# Patient Record
Sex: MALE | Race: WHITE
[De-identification: names, ages, dates, MRNs, and addresses within clinical notes are randomized per-mention and may not be internally consistent; named-entity substitution may affect disease eponyms.]

---

## 2017-01-25 ENCOUNTER — HOSPITAL ENCOUNTER (EMERGENCY)
Dept: HOSPITAL 74 - JER | Age: 52
Discharge: HOME | End: 2017-01-25
Payer: COMMERCIAL

## 2017-01-25 VITALS — TEMPERATURE: 97.3 F | HEART RATE: 65 BPM | DIASTOLIC BLOOD PRESSURE: 79 MMHG | SYSTOLIC BLOOD PRESSURE: 142 MMHG

## 2017-01-25 VITALS — BODY MASS INDEX: 34.5 KG/M2

## 2017-01-25 DIAGNOSIS — F31.9: ICD-10-CM

## 2017-01-25 DIAGNOSIS — R00.0: ICD-10-CM
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
## 2017-01-25 DIAGNOSIS — S06.0X9A: Primary | ICD-10-CM
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
## 2017-01-25 DIAGNOSIS — M79.1: ICD-10-CM

## 2017-01-25 DIAGNOSIS — I10: ICD-10-CM

## 2017-01-25 PROCEDURE — 3E0234Z INTRODUCTION OF SERUM, TOXOID AND VACCINE INTO MUSCLE, PERCUTANEOUS APPROACH: ICD-10-PCS

## 2017-01-25 NOTE — PDOC
History of Present Illness





- General


History Source: Patient


Exam Limitations: No Limitations





- History of Present Illness


Initial Comments: 


01/25/17 12:42


The patient is a 51-year-old man with a significant past medical history of 

hypertension, rapid heart beats, migraine headaches and bipolar disorder who 

presents to the emergency department for further evaluation of generalized 

bodily pain status post fall yesterday morning at approximately 09:45 AM. As 

per patient, he walked into [employer] yesterday to urinate. Patient got his 

foot caught into a pothole, fell forward on the curb and landing on his hands, 

knees and ultimately injured his head. Patient states he lost consciousness for 

approximately 2 minutes, he woke up lightheaded with blurry vision and 

nauseous. He states he was able to go home and had multiple episodes of emesis 

since he arrived home at approximately 11:00 AM throughout the night and this 

morning. He reports approximately 5-6 episodes of emesis, no blood or bile 

noted. He currently reports experiencing a headache, lightheadedness, nausea, 

blurry vision, mild mid-sternal discomfort and bilateral costochondral pain 

that is exacerbated when taking deep breathes and makes him short of breath. He 

took Percocet and Reglan prior to ER arrival, which helped minimally with his 

pain.He denies other bodily pain or injury





He denies dizziness, lightheadedness, or palpitation.


He denies any fever, chills, cough, diarrhea, dysuria, hematuria, frequency, 

bowel/bladder incontinence or retention, abdominal pain,.





Allergies: Morphine. 


Social History: No tobacco, ETOH and recreational drug use. 








<Yoselin Allison - Last Filed: 01/25/17 15:10>





<Hill Virk - Last Filed: 01/25/17 15:26>





- General


Chief Complaint: Injury


Stated Complaint: FALL/ HEAD, RT KNEE, RIB NECK INJURY


Time Seen by Provider: 01/25/17 12:38





Past History





<Yoselin Allison - Last Filed: 01/25/17 15:10>





- Past Medical History


Cardiac Disorders: Yes (RAPID HEARTBEAT)


HTN: Yes


Seizures: Yes (BIPOLAR)





- Surgical History


Appendectomy: Yes





- Psycho/Social/Smoking Cessation Hx


Anxiety: No


Suicidal Ideation: No


Smoking Status: No


Smoking History: Never smoked


Number of Cigarettes Smoked Daily: 0


Hx Alcohol Use: No


Drug/Substance Use Hx: No


Substance Use Type: None





<Hill Virk - Last Filed: 01/25/17 15:26>





- Past Medical History


Allergies/Adverse Reactions: 


 Allergies











Allergy/AdvReac Type Severity Reaction Status Date / Time


 


morphine Allergy  Rash Verified 01/25/17 12:19


 


meperidine HCl [From Demerol] AdvReac   Verified 01/25/17 12:19











Home Medications: 


Ambulatory Orders





Atenolol [Tenormin] 50 mg PO BID 07/11/13 


Diazepam [Valium] 10 mg PO BID 07/11/13 


Ezetimibe [Zetia] 15 mg PO 07/11/13 


Hydrochlorothiazide [Hctz] 37.5 mg PO DAILY #0 cap 07/11/13 


Hydrocodone Bit/Acetaminophen [Lortab ] 1 tab PO TID PRN 07/11/13 


Naproxen [Naprosyn] 500 mg PO BID PRN #14 tablet 07/11/13 


Rosuvastatin Calcium [Crestor] 10 mg PO 07/11/13 


Sumatriptan [Imitrex] 5 mg NR 07/11/13 











**Review of Systems





- Review of Systems


Constitutional: No: Chills, Fever


HEENTM: Yes: Recent change in vision.  No: Double Vision


Respiratory: No: Cough, Shortness of Breath


Cardiac (ROS): Yes: See HPI


ABD/GI: Yes: Nausea, Vomiting.  No: Diarrhea


: No: Hematuria


Musculoskeletal: Yes: Joint Pain, Muscle Pain


Neurological: Yes: Headache.  No: Ataxia


All Other Systems: Reviewed and Negative





<Hill Virk - Last Filed: 01/25/17 15:26>





*Physical Exam





- Vital Signs


 Last Vital Signs











Temp Pulse Resp BP Pulse Ox


 


 97.6 F   72   20   152/81   95 


 


 01/25/17 12:13  01/25/17 12:13  01/25/17 12:13  01/25/17 12:13  01/25/17 12:13














- Physical Exam


Comments: 


01/25/17 12:42


General: Patient is alert and in no acute distress. Speech is clear and 

appropriate.


Head: C-collar in place. There is a 1.5 cm superficial abrasion to the right 

forehead without underlying scalp deformity. No septal hematoma.


HEENT: Pupils are equal round and reactive to light, extraocular movements are 

intact. The tympanic membranes are clear, no hemotympanum. No facial deformity/

tenderness, no septal hematoma. The oropharynx is clear.


DENTAL: 4th upper incisor decayed tooth with some partial avulsion.


Neck: The trachea is midline, there is no stridor. There is no midline cervical 

spine tenderness, full range of motion of neck.


Chest: There is some sternal discomfort to palpation without crepitus or 

obvious deformity, no ecchymosis or abrasions. There is tenderness in the 

lateral and anterior portion of the right ribs starting around rib 8-10. There 

is a similar discomfort to a lesser degree to the left lateral/anterior ribs. 


Heart: S1-S2, regular rate and rhythm. No murmurs.


Lungs: Clear to auscultation bilaterally. Symmetric chest rise.


Abdomen: Soft/nontender/nondistended. Bowel sounds are normal. No palpable 

liver or spleen enlargement. There is no abdominal or flank ecchymosis.


Back/Pelvis: There is no midline spinal tenderness or step-off. Pelvis is 

stable and nontender.


Extremities: Bilateral incisional knee scars. There is an approximately 

superficial 4-5 cm abrasion over the anterior knee. There is soft tissue 

swelling with some probable small joint effusion laterally and in the 

suprapatellar space. Flexion and extension are intact. There is some ecchymosis 

and soft tissue swelling dorsal aspect of the right 5th metacarpal with some 

tenderness to palpation. There is full range of motion of the MCP, PIP and DIP 

joints. Neurovascularly intact


Neuro: Alert and oriented x3. Cranial nerves II through XII are intact. 5 out 

of 5 motor strength x4 extremities. Finger-nose-finger is intact. No pronator 

drift. 


Skin: See above.


Psych: Affect is appropriate.








<Yoselin Allison - Last Filed: 01/25/17 15:10>





- Vital Signs


 Last Vital Signs











Temp Pulse Resp BP Pulse Ox


 


 97.6 F   72   20   152/81   95 


 


 01/25/17 12:13  01/25/17 12:13  01/25/17 12:13  01/25/17 12:13  01/25/17 12:13














<Hill Virk - Last Filed: 01/25/17 15:26>





ED Treatment Course





- RADIOLOGY


Radiograph Interpretation: 


01/25/17 14:54


EXAM: RAD/RIBS BILATERAL 


IMPRESSION: A frontal view of the chest and 2 oblique views of the rib cage 

were submitted for evaluation Compared to prior chest x-ray dated 7/11/2013 The 

cardiac silhouette remains within normal limits in size and the lung is clear. 

No pneumothorax or pleural effusion is identified. No focal airspace disease is 

seen.. Mediastinum and visualized osseous structures appear intact. No gross 

rib fracture is identified.





EXAM:  RAD/KNEE 2 POS-RIGHT 


IMPRESSION:  There are postoperative changes in the right knee with metallic 

screw in the distal femur] in the proximal tibia. There are mild osteoarthritic 

changes mainly involving the medial knee joint compartment. Mild-to-moderate 

osteoarthritic changes involving the patellofemoral joint. No acute fracture, 

dislocation or joint effusion is present. 





EXAM: RAD/HAND- RIGHT


IMPRESSION: The alignment is satisfactory without evidence of a fracture or 

dislocation. No gross soft tissue swelling or radiopaque foreign body is seen. 





EXAM: CT/HEAD CT WITHOUT CONTRAST 


IMPRESSION: No prior is available for comparison There is mild volume loss, 

ventricular prominence and probable chronic microvascular ischemic changes. No 

mass lesion, focal acute infarct or intracranial hemorrhage is seen. There is 

no shift of the midline structures. Calcification of the cavernous carotid 

arteries are present. The calvarium is intact. Visualized paranasal sinuses and 

mastoid air cells are well aerated. Mild deviation of the nasal septum to the 

left.





EXAM:  CT/CERVICAL SPINE CT W/O CONTR


IMPRESSION: There is straightening of the cervical spine. No compression 

fracture, subluxation or prevertebral soft tissue swelling is seen. No jumped 

facets are identified. A small linear-like bone density seen just posterior to 

superior endplate of C5 vertebral body likely representing calcification of the 

posterior longitudinal ligament and less likely representing an old cortical 

avulsion fracture. At C3-C4 level there is minimal central disc bulge with 

posterior spur formation. Mild degenerative disc disease at C4-C5 level with 

right uncovertebral hypertrophy slightly to moderately narrowing the right 

foramen. C5-C6 mild degenerative disc disease and mild bilateral uncovertebral 

hypertrophy slightly narrowing the foramina. C6-C7 moderate degenerative disc 

disease with anterior and posterior spur formation as well as bilateral 

uncovertebral hypertrophy moderately narrowing the right foramen right 

impinging right C7 nerve root. C7-T1 mild degenerative disc disease and mild 

left paracentral posterior spur formation without cord impingement. Visualized 

portion of the airway appears unremarkable. No gross enlarged lymph nodes are 

identified. Lung windows at the thoracic inlet appear unremarkable. 





<Yoselin Allison - Last Filed: 01/25/17 15:10>





Medical Decision Making





- Medical Decision Making


01/25/17 13:20


A portion of this note was documented by scribe services under my direction. I 

have reviewed the details of the note, within reason, and agree with the 

documentation with the following case summary and management plan written by me.





51-year-old male with history of hypertension, multiple joints/bone pathology 

in the past including cervical spine disease, bilateral knee surgeries 

secondary to trauma in the past, presents with resistant headache with nausea/

vomiting, feeling groggy, right and left rib pain, right hand pain, and right 

knee pain after trip and fall yesterday.





Trauma exam as noted, patient is neurologically intact





51-year-old male with mechanical trip and fall about 24 hours ago, now with 

persistent musculoskeletal complaints and headache with nausea/vomiting. Not on 

blood thinners, is neurologically intact. Rule out TBI, otherwise presentation 

is consistent with concussion. Rule out fractures versus contusions.


CT head, CT C-spine


Bilateral rib films


Right hand x-ray


Right knee x-ray


Pain control, update tetanus


Reassess and dispo





01/25/17 15:17


Trauma imaging is unremarkable. Remains neuro intact c/o mild HA consistent 

with concussion syndrome. Ambulating comfortably, agrees with d/c plan and 

understands return criteria. 








<Hill Virk - Last Filed: 01/25/17 15:26>





*DC/Admit/Observation/Transfer





- Attestations


Scribe Attestion: 


01/25/17 12:42


Documentation prepared by Yoselin Allison, acting as medical scribe for 

Hill Virk MD.





<Yoselin Allison - Last Filed: 01/25/17 15:10>





<Hill Virk - Last Filed: 01/25/17 15:26>


Diagnosis at time of Disposition: 


 Musculoskeletal pain





Accidental fall


Qualifiers:


 Encounter type: initial encounter Qualified Code(s): W19.XXXA - Unspecified 

fall, initial encounter





Closed head injury


Qualifiers:


 Encounter type: initial encounter Qualified Code(s): S09.90XA - Unspecified 

injury of head, initial encounter





Concussion


Qualifiers:


 Encounter type: initial encounter Loss of consciousness presence/duration: 

with LOC of unspecified duration Qualified Code(s): S06.0X9A - Concussion with 

loss of consciousness of unspecified duration, initial encounter





- Discharge Dispostion


Disposition: HOME


Condition at time of disposition: Improved





- Referrals


Referrals: 


Mani Duffy [Primary Care Provider] - 


Kirill Small MD [Staff Physician] - 





- Patient Instructions


Printed Discharge Instructions:  DI for Closed Head Injury, DI for Concussion


Additional Instructions: 


Stay hydrated. Tylenol 1000 mg every 8 hours and/or ibuprofen 600 mg every 8 

hours as needed for pain. Reglan as needed for nausea.





A CT of the head and cervical spine, and xrays of the ribs/R hand/R knee showed 

no acute fractures. You likely did sustain contusions and soft tissue injuries 

to these areas. If symptoms persist, an MRI can be performed as an outpatient 

to further evaluate the cause. 





Your symptoms are most consistent with a concussion. It is recommended that you 

have physical rest, avoiding any activities that may increase the likelihood of 

you reinjuring your head. Cognitive rest is also recommended, avoid prolonged 

monitor exposure, reading, or loud noises.





You should follow up with your primary doctor and/or a neurologist (consider 

calling Dr. Small) as soon as possible regarding today's emergency department 

visit.





Return to the emergency department for any new or concerning symptoms, 

particularly worsening headache, vomiting or confusion, worsening sleepiness, 

focal weakness, severe swelling or discoloration.

## 2017-10-02 ENCOUNTER — HOSPITAL ENCOUNTER (EMERGENCY)
Dept: HOSPITAL 74 - JER | Age: 52
LOS: 1 days | Discharge: HOME | End: 2017-10-03
Payer: COMMERCIAL

## 2017-10-02 VITALS — BODY MASS INDEX: 39 KG/M2

## 2017-10-02 VITALS — HEART RATE: 73 BPM | SYSTOLIC BLOOD PRESSURE: 141 MMHG | DIASTOLIC BLOOD PRESSURE: 93 MMHG | TEMPERATURE: 98 F

## 2017-10-02 DIAGNOSIS — G43.909: ICD-10-CM

## 2017-10-02 DIAGNOSIS — F31.9: ICD-10-CM

## 2017-10-02 DIAGNOSIS — R51: Primary | ICD-10-CM

## 2017-10-02 DIAGNOSIS — I10: ICD-10-CM

## 2017-10-02 DIAGNOSIS — M48.00: ICD-10-CM
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
## 2018-05-09 ENCOUNTER — HOSPITAL ENCOUNTER (INPATIENT)
Dept: HOSPITAL 74 - JER | Age: 53
LOS: 3 days | Discharge: HOME | DRG: 176 | End: 2018-05-12
Attending: INTERNAL MEDICINE | Admitting: INTERNAL MEDICINE
Payer: COMMERCIAL

## 2018-05-09 VITALS — BODY MASS INDEX: 36.9 KG/M2

## 2018-05-09 DIAGNOSIS — G43.909: ICD-10-CM

## 2018-05-09 DIAGNOSIS — I25.10: ICD-10-CM

## 2018-05-09 DIAGNOSIS — R60.9: ICD-10-CM

## 2018-05-09 DIAGNOSIS — R91.1: ICD-10-CM

## 2018-05-09 DIAGNOSIS — E66.9: ICD-10-CM

## 2018-05-09 DIAGNOSIS — K21.9: ICD-10-CM

## 2018-05-09 DIAGNOSIS — I26.99: Primary | ICD-10-CM

## 2018-05-09 DIAGNOSIS — R74.8: ICD-10-CM

## 2018-05-09 DIAGNOSIS — F31.9: ICD-10-CM

## 2018-05-09 DIAGNOSIS — I10: ICD-10-CM

## 2018-05-09 DIAGNOSIS — Z86.718: ICD-10-CM

## 2018-05-09 LAB
ALBUMIN SERPL-MCNC: 3.8 G/DL (ref 3.4–5)
ALP SERPL-CCNC: 123 U/L (ref 45–117)
ALT SERPL-CCNC: 39 U/L (ref 12–78)
ANION GAP SERPL CALC-SCNC: 7 MMOL/L (ref 8–16)
AST SERPL-CCNC: 42 U/L (ref 15–37)
BASOPHILS # BLD: 0.3 % (ref 0–2)
BILIRUB SERPL-MCNC: 0.3 MG/DL (ref 0.2–1)
BNP SERPL-MCNC: 455.64 PG/ML (ref 5–125)
BUN SERPL-MCNC: 22 MG/DL (ref 7–18)
CALCIUM SERPL-MCNC: 8.6 MG/DL (ref 8.5–10.1)
CHLORIDE SERPL-SCNC: 99 MMOL/L (ref 98–107)
CO2 SERPL-SCNC: 35 MMOL/L (ref 21–32)
CREAT SERPL-MCNC: 1.3 MG/DL (ref 0.7–1.3)
DEPRECATED RDW RBC AUTO: 15.6 % (ref 11.9–15.9)
EOSINOPHIL # BLD: 5.1 % (ref 0–4.5)
GLUCOSE SERPL-MCNC: 120 MG/DL (ref 74–106)
HCT VFR BLD CALC: 40.9 % (ref 35.4–49)
HGB BLD-MCNC: 13.8 GM/DL (ref 11.7–16.9)
LYMPHOCYTES # BLD: 36.4 % (ref 8–40)
MAGNESIUM SERPL-MCNC: 2 MG/DL (ref 1.8–2.4)
MCH RBC QN AUTO: 29.9 PG (ref 25.7–33.7)
MCHC RBC AUTO-ENTMCNC: 33.7 G/DL (ref 32–35.9)
MCV RBC: 88.7 FL (ref 80–96)
MONOCYTES # BLD AUTO: 10.5 % (ref 3.8–10.2)
NEUTROPHILS # BLD: 47.7 % (ref 42.8–82.8)
PLATELET # BLD AUTO: 237 K/MM3 (ref 134–434)
PMV BLD: 8.7 FL (ref 7.5–11.1)
POTASSIUM SERPLBLD-SCNC: 4.4 MMOL/L (ref 3.5–5.1)
PROT SERPL-MCNC: 6.9 G/DL (ref 6.4–8.2)
RBC # BLD AUTO: 4.61 M/MM3 (ref 4–5.6)
SODIUM SERPL-SCNC: 141 MMOL/L (ref 136–145)
WBC # BLD AUTO: 7.2 K/MM3 (ref 4–10)

## 2018-05-09 PROCEDURE — G0009 ADMIN PNEUMOCOCCAL VACCINE: HCPCS

## 2018-05-09 NOTE — PDOC
History of Present Illness





- General


Chief Complaint: Pain


Stated Complaint: PAIN


Time Seen by Provider: 05/09/18 19:51


History Source: Patient


Exam Limitations: No Limitations





- History of Present Illness


Initial Comments: 





05/09/18 22:32


Patient is a 53M with history of blood clot in 2008 (not on blood thinners), 

CAD s/p cath showing unknown abnormality in LAD, ?CHF (on lasix), bipolar 

disease and migraines coming in today complaining of chest pain and lower 

extremity swelling/pain for the past week. He describes the chest pain as a 

pressure on the left side of his chest that worsens with exercise and improves 

with rest. Patient states that he had surgery on his left knee in mid-March 

that was unsuccessful. He endorses headache. He denies fevers, chills, nausea, 

vomiting. He states that he has increased his lasix recently to 80mg.





Past History





- Past Medical History


Allergies/Adverse Reactions: 


 Allergies











Allergy/AdvReac Type Severity Reaction Status Date / Time


 


morphine Allergy  Rash Verified 05/09/18 19:56


 


meperidine HCl [From Demerol] AdvReac   Verified 05/09/18 19:56











Home Medications: 


Ambulatory Orders





Atenolol [Tenormin] 50 mg PO DAILY 07/11/13 


Diazepam [Valium] 10 mg PO TID PRN 07/11/13 


Sumatriptan [Imitrex] 100 mg PO DAILY PRN 07/11/13 


Amlodipine Besylate [Norvasc -] 5 mg PO DAILY 10/06/17 


Atorvastatin Ca [Lipitor] 80 mg PO DAILY 10/06/17 


Benazepril HCl [Lotensin (Nf)] 10 mg PO DAILY 10/06/17 


Ergocalciferol [Vitamin D2] 50,000 unit PO Q7D 10/06/17 


Fluticasone Prop 0.05% Nasal [Flonase -] 3 spray NS DAILY 10/06/17 


Ketotifen Fumarate [Allergy Eye Drops] 5 ml OU BID 10/06/17 


Metoclopramide HCl [Reglan] 5 mg PO TID 10/06/17 


Omeprazole 40 mg PO DAILY 10/06/17 


Oxycodone HCl/Acetaminophen [Percocet  mg Tablet] 1 each PO BID PRN 10/06/

17 


Quetiapine Fumarate [Seroquel] 100 mg PO TID 10/06/17 


Zolpidem Tartrate [Ambien] 10 mg PO HS PRN 10/06/17 








Cardiac Disorders: Yes (RAPID HEARTBEAT)


COPD: No


GI Disorders: Yes (GERD)


HTN: Yes


Seizures: Yes (BIPOLAR)


Other medical history: DVT @ 2008





- Surgical History


Abdominal Surgery: Yes (bilat hernia)


Appendectomy: Yes





- Suicide/Smoking/Psychosocial Hx


Smoking Status: No


Smoking History: Never smoked


Have you smoked in the past 12 months: No


Number of Cigarettes Smoked Daily: 0


Hx Alcohol Use: No


Drug/Substance Use Hx: No


Substance Use Type: None





**Review of Systems





- Review of Systems


Able to Perform ROS?: Yes


Comments:: 





05/09/18 23:03


GENERAL/CONSTITUTIONAL: No fever or chills. No weakness.


HEAD, EYES, EARS, NOSE AND THROAT: No change in vision. No sore throat.


CARDIOVASCULAR: No chest pain or shortness of breath


RESPIRATORY: No cough, wheezing, or hemoptysis.


GASTROINTESTINAL: No nausea, vomiting, diarrhea or constipation.


GENITOURINARY: No dysuria, frequency, or change in urination.


MUSCULOSKELETAL: Positive for left knee pain.. No neck or back pain.


SKIN: No rash


NEUROLOGIC: Positive for headache. Negative for vertigo, loss of consciousness, 

or change in strength/sensation.


ENDOCRINE: No increased thirst. No abnormal weight change


HEMATOLOGIC/LYMPHATIC: No anemia. Positive for history of blood clots.


ALLERGIC/IMMUNOLOGIC: No hives or skin allergy.








*Physical Exam





- Vital Signs


 Last Vital Signs











Temp Pulse Resp BP Pulse Ox


 


 98.6 F   81   18   107/60   97 


 


 05/09/18 19:57  05/09/18 19:57  05/09/18 19:57  05/09/18 19:57  05/09/18 19:57














- Physical Exam


Comments: 





05/09/18 23:04


GENERAL: Awake, alert, and fully oriented, in no acute distress


HEAD: No signs of trauma, normocephalic, atraumatic


EYES: PERRLA, EOMI, sclera anicteric, conjunctiva clear


ENT: Auricles normal inspection, hearing grossly normal, nares patent, 

oropharynx clear without


exudates. Moist mucosa


NECK: Normal ROM, supple, no lymphadenopathy, JVD, or masses


LUNGS: No distress, speaks full sentences, clear to auscultation bilaterally


HEART: Regular rate and rhythm, normal S1 and S2, no murmurs, rubs or gallops, 

peripheral pulses normal and equal bilaterally.


ABDOMEN: Soft, nontender, normoactive bowel sounds.  No guarding, no rebound.  

No masses


EXTREMITIES: Normal inspection, Normal range of motion, no pitting edema, left 

leg slightly bigger than right.


NEUROLOGICAL: Cranial nerves II through XII grossly intact.  Normal speech, 

normal gait, no focal sensorimotor deficits


SKIN: Warm, Dry, normal turgor, no rashes or lesions noted.








**Heart Score/ECG Review





- History


History: Moderately suspicious





- Electrocardiogram


EKG: Normal





- Age


Age: 45-65





- Risk Factors


Risk Factors Heart Score: Yes Hx Obesity


Based on the list above the patient has:: 1-2 risk factors





- Troponin


Troponin: </= normal limit





- Score


Heart Score - Total: 3





ED Treatment Course





- LABORATORY


CBC & Chemistry Diagram: 


 05/09/18 20:34





 05/09/18 20:34





- ADDITIONAL ORDERS


Additional order review: 


 Laboratory  Results











  05/09/18 05/09/18





  20:34 20:34


 


PT with INR   Cancelled


 


INR   Cancelled


 


Sodium  141 


 


Potassium  4.4 


 


Chloride  99 


 


Carbon Dioxide  35 H D 


 


Anion Gap  7 L 


 


BUN  22 H D 


 


Creatinine  1.3  D 


 


Creat Clearance w eGFR  57.53 


 


Random Glucose  120 H D 


 


Calcium  8.6 


 


Magnesium  2.0 


 


Total Bilirubin  0.3  D 


 


AST  42 H D 


 


ALT  39 


 


Alkaline Phosphatase  123 H 


 


Troponin I  < 0.02 


 


B-Natriuretic Peptide  455.64 H 


 


Total Protein  6.9 


 


Albumin  3.8 








 











  05/09/18





  20:34


 


RBC  4.61


 


MCV  88.7


 


MCHC  33.7


 


RDW  15.6  D


 


MPV  8.7


 


Neutrophils %  47.7  D


 


Lymphocytes %  36.4  D


 


Monocytes %  10.5 H


 


Eosinophils %  5.1 H D


 


Basophils %  0.3














- RADIOLOGY


Radiology Studies Ordered: 














 Category Date Time Status


 


 CHEST CTA [CT] Stat CT Scan  05/09/18 22:17 Ordered


 


 CXRPORT [CHEST X-RAY PORTABLE*] [RAD] Stat Radiology  05/09/18 22:19 Ordered














- Medications


Given in the ED: 


ED Medications














Discontinued Medications














Generic Name Dose Route Start Last Admin





  Trade Name Freq  PRN Reason Stop Dose Admin


 


Aspirin  162 mg  05/09/18 19:55  05/09/18 20:35





  Asa -  PO  05/09/18 19:56  162 mg





  ONCE ONE   Administration














Medical Decision Making





- Medical Decision Making





05/09/18 23:04


Patient is a 53M with history of bipolar, ?CHF, DVT, CAD here today complaining 

of chest pain. Vital signs stable and normal. Patient does not seem to be a 

reliable historian. States that his blood pressure was 190/110 before coming 

home, is unsure of what medications he takes, and is difficult to redirect to 

questioned ask. Chest pain not very typical. Patient has multiple risk factors 

for DVT and PE with suspicious history. DDx includes, but is not limited to: 

CHF exacerbation, ACS, PE, DVT.





EKG shows normal sinus rhythm with rate of 71. No st elevations/depressions. No 

significant t wave inversions. Normal QRS/MS/QTc intervals.





Labs show:





 Laboratory Tests











  05/09/18





  20:34


 


Troponin I  < 0.02


 


B-Natriuretic Peptide  455.64 H








CBC reassuring. Trop undetectable, BNP mildly elevated. CXR pending.





Will evaluate further with CTA and DVT US. HEART score 3. Will do second trop 

and discharge if negative.





05/10/18 03:14


DVT negative. CTA positive for LLL clot. INR cancelled earlier, redrawn before 

starting anticoagulation. Patient started on 120mg enoxaparin. Will admit to 

tele via hospitalist.


05/10/18 03:19


Dr Vieyra accepted admission.





*DC/Admit/Observation/Transfer


Diagnosis at time of Disposition: 


 Pulmonary embolism








- Discharge Dispostion


Condition at time of disposition: Stable


Decision to Admit order: Yes





- Referrals





- Patient Instructions





- Post Discharge Activity

## 2018-05-09 NOTE — PDOC
Rapid Medical Evaluation


Time Seen by Provider: 05/09/18 19:51


Medical Evaluation: 


 Allergies











Allergy/AdvReac Type Severity Reaction Status Date / Time


 


morphine Allergy  Rash Verified 10/02/17 22:25


 


meperidine HCl [From Demerol] AdvReac   Verified 10/02/17 22:25











05/09/18 19:51


I have performed a brief in-person evaluation of this patient.





The patient presents with a chief complaint of: SOB





Pertinent physical exam findings: crackles to b/l bases, 3+ pedal edema





I have ordered the following: CBC, CMP, BNP, UA, CXR, Cardiac profile, EKG, 

coags





The patient will proceed to the ED for further evaluation.








**Discharge Disposition





- Diagnosis


 SOB (shortness of breath)








- Referrals





- Patient Instructions





- Post Discharge Activity

## 2018-05-09 NOTE — PDOC
Attending Attestation





- HPI


HPI: 





05/09/18 23:01


The patient is a 53 year old male with a significant PMH of DVT (2008), CAD (s/

p catheterization), recent left knee surgery, HTN, and GERD who presents to the 

emergency department with left leg pain, bilateral leg swelling, and chest 

pain. He describes his chest pain as a pressure-like sensation localized on the 

left side, which is aggravated by exercise and alleviated by rest. THe patient 

denies fever or chills. He denies weakness or numbness.


 


Allergies: Morphine, Meperidine HCl. 








<Calderon Ramsey - Last Filed: 05/09/18 23:01>





- Resident


Resident Name: Stefan Mercedes





- ED Attending Attestation


I have performed the following: I have examined & evaluated the patient, The 

case was reviewed & discussed with the resident, I agree w/resident's findings 

& plan, Exceptions are as noted





- Physicial Exam


PE: 





05/10/18 19:27


 *Physical Exam





General Appearance: Yes: Appropriately Dressed.  No: Apparent Distress, 

Intoxicated


HEENT: positive: EOMI, ANDREE, Normal ENT Inspection, Normal Voice, TMs Normal, 

Pharynx Normal.  negative: Pale Conjunctivae, Photophobia, Scleral Icterus (R), 

Scleral Icterus (L)


Neck: positive: Trachea midline, Normal Thyroid, Supple.  negative: Tender, 

Rigid, Carotid bruit, Stridor, Lymphadenopathy (R), Lymphadenopathy (L), 

Thyromegaly


Respiratory/Chest: positive: Lungs Clear, Normal Breath Sounds.  negative: 

Chest Tender, Respiratory Distress, Accessory Muscle Use, Labored Respiration, 

RES, Crackles, Rales, Rhonchi, Stridor, Wheezing, Dullness


Cardiovascular: positive: Regular Rhythm, Regular Rate, S1, S2.  negative: Edema

, JVD, Murmur, Bradycardia, Tachycardia


Vascular Pulses: Dorsalis-Pedis (R): 2+, Doralis-Pedis (L): 2+


Gastrointestinal/Abdominal: positive: Normal Bowel Sounds, Flat, Soft.  negative

: Tender, Organomegaly, Pulsatile Mass, Increased Bowel Sounds, Decreased BS, 

Distended, Guarding, Rebound, Hernia, Hepatomegaly, Spleenomegaly


Lymphatic: negative: Adenopathy, Tenderness


Musculoskeletal: positive: Normal Inspection.  negative: CVA Tenderness, 

Decreased Range of Motion


Extremity: positive: Normal Capillary Refill, Normal Inspection, Normal Range 

of Motion, Pelvis Stable.  negative: Tender, Pedal Edema, Swelling, Erythema


Integumentary: positive: Normal Color, Dry, Warm.  negative: Cyanotic, Erythema

, Jaundice, Rash


Neurologic: positive: CNs II-XII NML intact, Fully Oriented, Alert, Normal Mood/

Affect, Motor Strength 5/5.  negative: EOM Palsy, Facial Droop, Sensory Deficit








- Medical Decision Making





05/10/18 19:31


Pt  admitted for further treatment





<Maik Balderrama - Last Filed: 05/10/18 19:32>





**Heart Score/ECG Review


  ** #1





05/09/18 23:01


EKG done at 20:33


Vent rate 71 bpm 


Normal sinus rhythm 


Normal ECG 





<Calderon Ramsey - Last Filed: 05/09/18 23:01>

## 2018-05-10 LAB
INR BLD: 1.11 (ref 0.82–1.09)
PT PNL PPP: 12.5 SEC (ref 9.7–13)

## 2018-05-10 RX ADMIN — GABAPENTIN SCH MG: 300 CAPSULE ORAL at 21:56

## 2018-05-10 RX ADMIN — ACETAMINOPHEN PRN MG: 325 TABLET ORAL at 21:57

## 2018-05-10 RX ADMIN — ZOLPIDEM TARTRATE PRN MG: 5 TABLET, COATED ORAL at 21:56

## 2018-05-10 RX ADMIN — QUETIAPINE FUMARATE SCH MG: 100 TABLET ORAL at 21:56

## 2018-05-10 RX ADMIN — AMLODIPINE BESYLATE SCH MG: 5 TABLET ORAL at 10:08

## 2018-05-10 RX ADMIN — ENOXAPARIN SODIUM SCH MG: 120 INJECTION SUBCUTANEOUS at 21:55

## 2018-05-10 RX ADMIN — QUETIAPINE FUMARATE SCH MG: 100 TABLET ORAL at 14:33

## 2018-05-10 RX ADMIN — ATENOLOL SCH MG: 50 TABLET ORAL at 10:09

## 2018-05-10 RX ADMIN — FUROSEMIDE SCH MG: 40 TABLET ORAL at 10:08

## 2018-05-10 RX ADMIN — QUETIAPINE FUMARATE SCH MG: 100 TABLET ORAL at 06:32

## 2018-05-10 NOTE — PN
Teaching Attending Note


Name of Resident: Doreen Crisostomo





ATTENDING PHYSICIAN STATEMENT





I saw and evaluated the patient.


Chart, data, imaging reviewed. 


I reviewed the resident's note and discussed the case with the resident.


I agree with the resident's findings and plan as documented.








SUBJECTIVE:


53 year old male with a significant PMH of DVT (2008), CAD (s/p catheterization)

, recent left knee surgery, HTN, and GERD who presents to the emergency 

department with left knee pain, bilateral leg swelling, and sharp left sided 

lower chest pain since 5/9. Worsened with breathing. 


No recent trauma.  CTA of chest showed small left lung PE. Saturating well on 

room air. No tachycardia. 











OBJECTIVE:


 Last Vital Signs











Temp Pulse Resp BP Pulse Ox


 


 98.2 F   68   20   110/68   99 


 


 05/10/18 01:27  05/10/18 01:27  05/10/18 01:27  05/10/18 01:27  05/10/18 01:27








general -nad, comfortable, obese 


heent- at, nc, no sinus tenderness


cv-s1+s2+rrr 


chest  clear to ausculation


abdomen- obese, nontender 


ext- trace pedal edema, no cynaosis 


skin- no rashes 





 Abnormal Lab Results











  05/09/18 05/09/18





  20:34 20:34


 


Monocytes %  10.5 H 


 


Eosinophils %  5.1 H D 


 


Carbon Dioxide   35 H D


 


Anion Gap   7 L


 


BUN   22 H D


 


Random Glucose   120 H D


 


AST   42 H D


 


Alkaline Phosphatase   123 H


 


B-Natriuretic Peptide   455.64 H














ASSESSMENT AND PLAN:





#Pulm embolism, unprovoked - LLL perfusion defect, likely cause of the chest 

pain, hemodynamicaly stable. (ACS unlikely as trop x2 negative and no acute 

ischemic changes on EKG)


-lovenox 120mg sc q12hrs 


-consider to switch to oral anticoagulant for discharge 


-heme/onc eval for unprovoked pe for duration of anticoagulation 





#LE swelling, might be due to CHF or venous stasis, dvt was r/o


-transthoracic echo 





#HTN


- continue atenolol 50mg po daily


- lasix 40mg po daily, norvasc 5, atenolol 50mg po dialy





#DVT- on lovenox 


-see resident note for details

## 2018-05-10 NOTE — PN
Physical Exam: 


SUBJECTIVE: Patient seen and examined. The patient is complaining of pain on 

left side of his chest and fatigue. He denies SOB, palpitations, 








OBJECTIVE:





 Vital Signs











 Period  Temp  Pulse  Resp  BP Sys/Guerra  Pulse Ox


 


 Last 24 Hr  98.2 F-98.6 F  68-81  18-20  107-122/60-75  95-99











GENERAL: The patient is awake, alert, and fully oriented, in no acute distress.


HEAD: Normal with no signs of trauma.


EYES: PERRL, extraocular movements intact, sclera anicteric, conjunctiva clear. 

No ptosis. 


ENT: Ears normal, nares patent, oropharynx clear without exudates, moist mucous 


membranes.


NECK: Trachea midline, full range of motion, supple. 


LUNGS: Breath sounds equal, clear to auscultation bilaterally, no wheezes, no 

crackles, no 


accessory muscle use. 


HEART: Regular rate and rhythm, S1, S2 without murmur, rub or gallop.


ABDOMEN: Soft, nontender, nondistended, normoactive bowel sounds, no guarding, 

no 


rebound, no hepatosplenomegaly, no masses.


EXTREMITIES: 2+ pulses, warm, well-perfused, no edema. 


NEUROLOGICAL: Cranial nerves II through XII grossly intact. Normal speech, gait 

not 


observed.


PSYCH: Normal mood, normal affect.


SKIN: Warm, dry, normal turgor, no rashes or lesions noted














 Laboratory Results - last 24 hr











  05/09/18 05/09/18 05/09/18





  20:34 20:34 20:34


 


WBC  7.2  


 


RBC  4.61  


 


Hgb  13.8  


 


Hct  40.9  


 


MCV  88.7  


 


MCH  29.9  


 


MCHC  33.7  


 


RDW  15.6  D  


 


Plt Count  237  


 


MPV  8.7  


 


Neutrophils %  47.7  D  


 


Lymphocytes %  36.4  D  


 


Monocytes %  10.5 H  


 


Eosinophils %  5.1 H D  


 


Basophils %  0.3  


 


PT with INR   Cancelled 


 


INR   Cancelled 


 


Sodium    141


 


Potassium    4.4


 


Chloride    99


 


Carbon Dioxide    35 H D


 


Anion Gap    7 L


 


BUN    22 H D


 


Creatinine    1.3  D


 


Creat Clearance w eGFR    57.53


 


Random Glucose    120 H D


 


Calcium    8.6


 


Magnesium    2.0


 


Total Bilirubin    0.3  D


 


AST    42 H D


 


ALT    39


 


Alkaline Phosphatase    123 H


 


Creatine Kinase    274


 


Creatine Kinase Index    0.6


 


CK-MB (CK-2)    1.77


 


Troponin I    < 0.02


 


B-Natriuretic Peptide    455.64 H


 


Total Protein    6.9


 


Albumin    3.8














  05/10/18 05/10/18





  00:37 03:35


 


WBC  


 


RBC  


 


Hgb  


 


Hct  


 


MCV  


 


MCH  


 


MCHC  


 


RDW  


 


Plt Count  


 


MPV  


 


Neutrophils %  


 


Lymphocytes %  


 


Monocytes %  


 


Eosinophils %  


 


Basophils %  


 


PT with INR   12.50


 


INR   1.11


 


Sodium  


 


Potassium  


 


Chloride  


 


Carbon Dioxide  


 


Anion Gap  


 


BUN  


 


Creatinine  


 


Creat Clearance w eGFR  


 


Random Glucose  


 


Calcium  


 


Magnesium  


 


Total Bilirubin  


 


AST  


 


ALT  


 


Alkaline Phosphatase  


 


Creatine Kinase  284 


 


Creatine Kinase Index  0.6 


 


CK-MB (CK-2)  1.857 


 


Troponin I  < 0.02 


 


B-Natriuretic Peptide  


 


Total Protein  


 


Albumin  








Active Medications











Generic Name Dose Route Start Last Admin





  Trade Name Freq  PRN Reason Stop Dose Admin


 


Amlodipine Besylate  5 mg  05/10/18 10:00  05/10/18 10:08





  Norvasc -  PO   5 mg





  DAILY TINA   Administration


 


Atenolol  50 mg  05/10/18 10:00  05/10/18 10:09





  Tenormin -  PO   50 mg





  DAILY TINA   Administration


 


Enoxaparin Sodium  120 mg  05/10/18 22:00  





  Lovenox -  SQ   





  BID TINA   


 


Furosemide  40 mg  05/10/18 10:00  05/10/18 10:08





  Lasix -  PO   40 mg





  DAILY TINA   Administration


 


Quetiapine Fumarate  100 mg  05/10/18 06:00  05/10/18 06:32





  Seroquel -  PO   100 mg





  TID TINA   Administration











ASSESSMENT/PLAN:


53 year r old man with HTN, hx of DVT, presents with left lower chest pain and 

SOB found to have left lung pe.  





Pulmonary embolism: 


-LLL, hemodynamically stable, no tachycardia, BP controlled, likely cause of 

the chest pain


-continue Lovenox 120 mg IV BID


-hematology consulted for duration of AC 


-Pulmonary consulted





LE swelling, 


-might be due to CHF


-echo ordered for evaluation, r/o right heart strain from PE


-continue lasix 40mg po daily


-Dupplex of lower extremities no acute pathology





HTN:


- continue atenolol 50mg po daily


- cont lasix 40mg po daily,


- cont norvasc 5 daily





Chronic migranes:


-continue Sumatriptan





Hyperlipidemia:


-continue Atorvastatin





GERD:


-cont Omeprazole





Bipolar disorder:


-cont Seroquel





DVT: on lovenox





F/E/N:no/no changes/low sodium





Disposition: telemetry monitoring








Problem List





- Problems


(1) Pulmonary embolism


Code(s): I26.99 - OTHER PULMONARY EMBOLISM WITHOUT ACUTE COR PULMONALE   





(2) Hyperlipidemia


Code(s): E78.5 - HYPERLIPIDEMIA, UNSPECIFIED   





(3) Hypertension


Code(s): I10 - ESSENTIAL (PRIMARY) HYPERTENSION   





Visit type





- Emergency Visit


Emergency Visit: Yes


ED Registration Date: 05/10/18


Care time: The patient presented to the Emergency Department on the above date 

and was hospitalized for further evaluation of their emergent condition.





- New Patient


This patient is new to me today: Yes


Date on this admission: 05/10/18





- Critical Care


Critical Care patient: No

## 2018-05-10 NOTE — EKG
Test Reason : 

Blood Pressure : ***/*** mmHG

Vent. Rate : 071 BPM     Atrial Rate : 071 BPM

   P-R Int : 172 ms          QRS Dur : 086 ms

    QT Int : 380 ms       P-R-T Axes : 016 -16 029 degrees

   QTc Int : 412 ms

 

NORMAL SINUS RHYTHM

NORMAL ECG

WHEN COMPARED WITH ECG OF 11-JUL-2013 15:58,

NO SIGNIFICANT CHANGE WAS FOUND

Confirmed by DAY BOLTON MD ([id number]) on 5/10/2018 11:44:20 AM

 

Referred By:             Confirmed By:DAY BOLTON MD

## 2018-05-10 NOTE — PN
Teaching Attending Note


Name of Resident: Arely Mullins





ATTENDING PHYSICIAN STATEMENT





I saw and evaluated the patient.


I reviewed the resident's note and discussed the case with the resident.


I agree with the resident's findings and plan as documented.








SUBJECTIVE: Patient is concerned about swelling of his feet. He denies SOB but 

still reports discomfort in his lower left anterior chest.








OBJECTIVE:


 Vital Signs











 Period  Temp  Pulse  Resp  BP Sys/Guerra  Pulse Ox


 


 Last 24 Hr  98.2 F-98.6 F  68-81  18-20  107-122/60-75  95-99








HEART: S1S2, RRR


LUNGS: Clear


ABDOMEN: Obese, soft, non-tender, non-distended, normal BS


EXTREMITIES: Trace bilateral pedal edema








 Laboratory Results - last 24 hr











  05/09/18 05/09/18 05/09/18





  20:34 20:34 20:34


 


WBC  7.2  


 


RBC  4.61  


 


Hgb  13.8  


 


Hct  40.9  


 


MCV  88.7  


 


MCH  29.9  


 


MCHC  33.7  


 


RDW  15.6  D  


 


Plt Count  237  


 


MPV  8.7  


 


Neutrophils %  47.7  D  


 


Lymphocytes %  36.4  D  


 


Monocytes %  10.5 H  


 


Eosinophils %  5.1 H D  


 


Basophils %  0.3  


 


PT with INR   Cancelled 


 


INR   Cancelled 


 


Sodium    141


 


Potassium    4.4


 


Chloride    99


 


Carbon Dioxide    35 H D


 


Anion Gap    7 L


 


BUN    22 H D


 


Creatinine    1.3  D


 


Creat Clearance w eGFR    57.53


 


Random Glucose    120 H D


 


Calcium    8.6


 


Magnesium    2.0


 


Total Bilirubin    0.3  D


 


AST    42 H D


 


ALT    39


 


Alkaline Phosphatase    123 H


 


Creatine Kinase    274


 


Creatine Kinase Index    0.6


 


CK-MB (CK-2)    1.77


 


Troponin I    < 0.02


 


B-Natriuretic Peptide    455.64 H


 


Total Protein    6.9


 


Albumin    3.8














  05/10/18 05/10/18





  00:37 03:35


 


WBC  


 


RBC  


 


Hgb  


 


Hct  


 


MCV  


 


MCH  


 


MCHC  


 


RDW  


 


Plt Count  


 


MPV  


 


Neutrophils %  


 


Lymphocytes %  


 


Monocytes %  


 


Eosinophils %  


 


Basophils %  


 


PT with INR   12.50


 


INR   1.11


 


Sodium  


 


Potassium  


 


Chloride  


 


Carbon Dioxide  


 


Anion Gap  


 


BUN  


 


Creatinine  


 


Creat Clearance w eGFR  


 


Random Glucose  


 


Calcium  


 


Magnesium  


 


Total Bilirubin  


 


AST  


 


ALT  


 


Alkaline Phosphatase  


 


Creatine Kinase  284 


 


Creatine Kinase Index  0.6 


 


CK-MB (CK-2)  1.857 


 


Troponin I  < 0.02 


 


B-Natriuretic Peptide  


 


Total Protein  


 


Albumin  








 Current Medications











Generic Name Dose Route Start Last Admin





  Trade Name Freq  PRN Reason Stop Dose Admin


 


Amlodipine Besylate  5 mg  05/10/18 10:00  





  Norvasc -  PO   





  DAILY [facility]   


 


Atenolol  50 mg  05/10/18 10:00  





  Tenormin -  PO   





  DAILY [facility]   


 


Furosemide  40 mg  05/10/18 10:00  





  Lasix -  PO   





  DAILY [facility]   


 


Quetiapine Fumarate  100 mg  05/10/18 06:00  05/10/18 06:32





  Seroquel -  PO   100 mg





  TID TINA   Administration














ASSESSMENT AND PLAN:


This is a 53 year old man with a history of HTN, CAD, DVT, bipolar disorder, 

migraines, chronic pain who presented to the ED with chest pain and SOB.  





1. Acute pulmonary embolus


   - Continue Lovenox


   - Hematology, pulmonary consults


2. Bilateral leg edema


   - Clinically, no evidence of CHF


   - Venous dopplers negative for DVT


   - Echo ordered


   - Continue Lasix


3. CAD


4. HTN


   - Continue atenolol, Lasix, Norvasc


5. Bipolar disorder


   - Continue Seroquel


6. Migraine headaches


7. Chronic pain
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
## 2018-05-10 NOTE — CONSULT
Consult


Consult Specialty:: Hematology 





- History of Present Illness


History of Present Illness: 





53 yr old man with hx of CAD(s/p cath), HTN, bipolar d.o with depression, 

migraines, chronic pain, s/p multiple joint surgeries presented to ED c/o left 

lower chest, nonradiating, intermittent, nonpositional, non-reproducible, a/w 

exertional dyspnea for one day. a/w with left hand tingling and swelling. 





No family hx of blood clots


No family hx of cancer





Hematology consulted for diagnosis of PE








- History Source


History Provided By: Patient, Medical Record





- Alcohol/Substance Use


Hx Alcohol Use: No





- Smoking History


Smoking history: Never smoked


Have you smoked in the past 12 months: No


Aproximately how many cigarettes per day: 0





Home Medications





- Allergies


Allergies/Adverse Reactions: 


 Allergies











Allergy/AdvReac Type Severity Reaction Status Date / Time


 


morphine Allergy  Rash Verified 05/09/18 19:56


 


meperidine HCl [From Demerol] AdvReac   Verified 05/09/18 19:56














- Home Medications


Home Medications: 


Ambulatory Orders





Atenolol [Tenormin] 50 mg PO DAILY 07/11/13 


Diazepam [Valium] 10 mg PO TID PRN 07/11/13 


Sumatriptan [Imitrex] 100 mg PO DAILY PRN 07/11/13 


Amlodipine Besylate [Norvasc -] 5 mg PO DAILY 10/06/17 


Atorvastatin Ca [Lipitor] 80 mg PO DAILY 10/06/17 


Benazepril HCl [Lotensin (Nf)] 10 mg PO DAILY 10/06/17 


Ergocalciferol [Vitamin D2] 50,000 unit PO Q7D 10/06/17 


Fluticasone Prop 0.05% Nasal [Flonase -] 3 spray NS DAILY 10/06/17 


Ketotifen Fumarate [Allergy Eye Drops] 5 ml OU BID 10/06/17 


Metoclopramide HCl [Reglan] 5 mg PO TID 10/06/17 


Omeprazole 40 mg PO DAILY 10/06/17 


Oxycodone HCl/Acetaminophen [Percocet  mg Tablet] 1 each PO BID PRN 10/06/

17 


Quetiapine Fumarate [Seroquel] 100 mg PO TID 10/06/17 


Zolpidem Tartrate [Ambien] 10 mg PO HS PRN 10/06/17 











Physical Exam


Vital Signs: 


 Vital Signs











Temperature  98.2 F   05/10/18 01:27


 


Pulse Rate  74   05/10/18 06:55


 


Respiratory Rate  18   05/10/18 10:00


 


Blood Pressure  122/75   05/10/18 06:55


 


O2 Sat by Pulse Oximetry (%)  95   05/10/18 06:55











Constitutional: Yes: No Distress, Calm


Eyes: Yes: Conjunctiva Clear


HENT: Yes: Atraumatic, Normocephalic


Neck: Yes: Supple, Trachea Midline


Cardiovascular: Yes: Regular Rate and Rhythm


Respiratory: Yes: Regular, CTA Bilaterally


Gastrointestinal: Yes: Normal Bowel Sounds, Abdomen, Obese, Other (mass-like 

soft tissue present below the left nipple).  No: Ascites


Musculoskeletal: Yes: WNL


Edema: No


Wound/Incision: Yes: Clean/Dry


Labs: 


 CBC, BMP





 05/09/18 20:34 





 05/09/18 20:34 











Imaging





- Results


Cat Scan: Report Reviewed, Image Reviewed





Problem List





- Problems


(1) Pulmonary embolism


Code(s): I26.99 - OTHER PULMONARY EMBOLISM WITHOUT ACUTE COR PULMONALE   





(2) Musculoskeletal pain


Code(s): M79.1 - MYALGIA   





(3) Accidental fall


Code(s): W19.XXXA - UNSPECIFIED FALL, INITIAL ENCOUNTER   


Qualifiers: 


   Encounter type: initial encounter   Qualified Code(s): W19.XXXA - 

Unspecified fall, initial encounter   





(4) Chest pain


Code(s): R07.9 - CHEST PAIN, UNSPECIFIED   





Assessment/Plan





New PE: 


Etiology: Likely multi-factorial- obesity, recent surgery, immobilization, but 

strongly advised the pt that he needs age appropriate cancer screening, he 

never had colonoscopy. May have had a DVT progressed. 


Alk phos slightly elevated--monitor , for US liver/spleen


On physical exam: has a palpable soft tissue mass-like below his left nipple 

area, will order soft tissue US , to characterize further. 


treatment: presently lovenox, can start NOACs ( either xarelto 15 mg twice 

daily with food for 21 days followed by 20 mg once daily with food. or eliquis 

10 mg twice daily for 7 days followed by 5 mg twice daily ). dw with him about 

NOACs. Please ensure insurance coverage. 


duration:for at least a period of 3-6m for now. 


for OP f/u

## 2018-05-11 LAB
ALBUMIN SERPL-MCNC: 3.6 G/DL (ref 3.4–5)
ALP SERPL-CCNC: 99 U/L (ref 45–117)
ALT SERPL-CCNC: 33 U/L (ref 12–78)
ANION GAP SERPL CALC-SCNC: 6 MMOL/L (ref 8–16)
AST SERPL-CCNC: 28 U/L (ref 15–37)
BILIRUB SERPL-MCNC: 0.4 MG/DL (ref 0.2–1)
BUN SERPL-MCNC: 13 MG/DL (ref 7–18)
CALCIUM SERPL-MCNC: 8.8 MG/DL (ref 8.5–10.1)
CHLORIDE SERPL-SCNC: 103 MMOL/L (ref 98–107)
CO2 SERPL-SCNC: 31 MMOL/L (ref 21–32)
CREAT SERPL-MCNC: 0.9 MG/DL (ref 0.7–1.3)
GLUCOSE SERPL-MCNC: 100 MG/DL (ref 74–106)
POTASSIUM SERPLBLD-SCNC: 4.2 MMOL/L (ref 3.5–5.1)
PROT SERPL-MCNC: 6.5 G/DL (ref 6.4–8.2)
SODIUM SERPL-SCNC: 140 MMOL/L (ref 136–145)

## 2018-05-11 RX ADMIN — ATORVASTATIN CALCIUM SCH: 80 TABLET, FILM COATED ORAL at 11:40

## 2018-05-11 RX ADMIN — GABAPENTIN SCH MG: 300 CAPSULE ORAL at 06:08

## 2018-05-11 RX ADMIN — GABAPENTIN SCH MG: 300 CAPSULE ORAL at 14:23

## 2018-05-11 RX ADMIN — FLUTICASONE PROPIONATE SCH SPRAYS: 50 SPRAY, METERED NASAL at 09:53

## 2018-05-11 RX ADMIN — ACETAMINOPHEN PRN MG: 325 TABLET ORAL at 09:55

## 2018-05-11 RX ADMIN — GABAPENTIN SCH MG: 300 CAPSULE ORAL at 21:55

## 2018-05-11 RX ADMIN — PANTOPRAZOLE SODIUM SCH: 40 TABLET, DELAYED RELEASE ORAL at 11:40

## 2018-05-11 RX ADMIN — QUETIAPINE FUMARATE SCH MG: 100 TABLET ORAL at 21:55

## 2018-05-11 RX ADMIN — ENOXAPARIN SODIUM SCH MG: 120 INJECTION SUBCUTANEOUS at 09:55

## 2018-05-11 RX ADMIN — ATENOLOL SCH: 50 TABLET ORAL at 11:40

## 2018-05-11 RX ADMIN — AMOXICILLIN AND CLAVULANATE POTASSIUM SCH TAB: 875; 125 TABLET, FILM COATED ORAL at 16:57

## 2018-05-11 RX ADMIN — AMLODIPINE BESYLATE SCH: 5 TABLET ORAL at 11:40

## 2018-05-11 RX ADMIN — FUROSEMIDE SCH: 40 TABLET ORAL at 11:40

## 2018-05-11 RX ADMIN — FLUTICASONE PROPIONATE SCH SPRAYS: 50 SPRAY, METERED NASAL at 23:00

## 2018-05-11 RX ADMIN — QUETIAPINE FUMARATE SCH MG: 100 TABLET ORAL at 06:08

## 2018-05-11 RX ADMIN — QUETIAPINE FUMARATE SCH MG: 100 TABLET ORAL at 13:02

## 2018-05-11 RX ADMIN — ACETAMINOPHEN PRN MG: 325 TABLET ORAL at 17:27

## 2018-05-11 RX ADMIN — ENOXAPARIN SODIUM SCH MG: 120 INJECTION SUBCUTANEOUS at 21:55

## 2018-05-11 RX ADMIN — ZOLPIDEM TARTRATE PRN MG: 5 TABLET, COATED ORAL at 23:00

## 2018-05-11 NOTE — PN
Progress Note (short form)





- Note


Progress Note: 





PAtient seen and examined


Still with some left chest pain





 Last Vital Signs











Temp Pulse Resp BP Pulse Ox


 


 97.4 F L  77   18   123/50   98 


 


 05/12/18 06:00  05/12/18 06:00  05/12/18 06:00  05/12/18 06:00  05/11/18 21:00








Cor: RSR, No murmurs, No gallops


Lungs: Clear to P&A


Abd: Soft, Normal bowel sounds, No organomegaly


Ext:No significant edema








Labs/Meds reviewed





A/P





52 y/o patient with new PE: 


Etiology: Likely multi-factorial- obesity, recent surgery, immobilization, but 

strongly advised the pt that he needs age appropriate cancer screening, he 

never had colonoscopy. 


Alk phos slightly elevated--monitor , 


u/s liver/spleen --fatty liver. chest u/s --no discrete mass


treatment: presently lovenox, can start NOACs ( either xarelto 15 mg twice 

daily with food for 21 days followed by 20 mg once daily with food. or eliquis 

10 mg twice daily for 7 days followed by 5 mg twice daily ). dw with him about 

NOACs. Please ensure insurance coverage.

## 2018-05-11 NOTE — PN
Teaching Attending Note


Name of Resident: Arely Mullins





ATTENDING PHYSICIAN STATEMENT





I saw and evaluated the patient.


I reviewed the resident's note and discussed the case with the resident.


I agree with the resident's findings and plan as documented.








SUBJECTIVE: Patient continues to have left-sided chest pain.








OBJECTIVE:


 Vital Signs











 Period  Temp  Pulse  Resp  BP Sys/Guerra  Pulse Ox


 


 Last 24 Hr  97.6 F-98.0 F  64-82  20-22  /56-74  96-98





HEART: S1S2, RRR


LUNGS: Clear


ABDOMEN: Obese, soft, non-tender, non-distended, normal BS


EXTREMITIES: Trace bilateral pedal edema








 Laboratory Results - last 24 hr











  05/11/18 05/11/18





  07:40 12:53


 


Sodium  140 


 


Potassium  4.2 


 


Chloride  103 


 


Carbon Dioxide  31 


 


Anion Gap  6 L 


 


BUN  13  D 


 


Creatinine  0.9  D 


 


Creat Clearance w eGFR  > 60 


 


Random Glucose  100 


 


Calcium  8.8 


 


Total Bilirubin  0.4  D 


 


AST  28  D 


 


ALT  33 


 


Alkaline Phosphatase  99 


 


Troponin I   < 0.02


 


Total Protein  6.5 


 


Albumin  3.6 








 Current Medications











Generic Name Dose Route Start Last Admin





  Trade Name Freq  PRN Reason Stop Dose Admin


 


Acetaminophen  325 mg  05/10/18 20:16  05/11/18 09:55





  Tylenol -  PO   325 mg





  Q8H PRN   Administration





  PAIN 7-10   


 


Amlodipine Besylate  5 mg  05/10/18 10:00  05/11/18 11:40





  Norvasc -  PO   Not Given





  DAILY [facility]   


 


Amoxicillin/Clavulanate Potassium  1 tab  05/11/18 17:30  





  Augmentin - 875mg Tablet  PO   





  BID@0800,1730 [facility]   


 


Atenolol  50 mg  05/10/18 10:00  05/11/18 11:40





  Tenormin -  PO   Not Given





  DAILY TINA   


 


Atorvastatin Calcium  80 mg  05/11/18 10:00  05/11/18 11:40





  Lipitor -  PO   Not Given





  DAILY TINA   


 


Diazepam  10 mg  05/10/18 20:48  05/11/18 13:03





  Valium -  PO   10 mg





  TID PRN   Administration





  MUSCLE SPASMS   


 


Enoxaparin Sodium  120 mg  05/10/18 22:00  05/11/18 09:55





  Lovenox -  SQ   120 mg





  BID TINA   Administration


 


Fluticasone Propionate  2 spray  05/11/18 10:00  05/11/18 09:53





  Flonase -  NS   2 sprays





  DAILY TINA   Administration


 


Furosemide  40 mg  05/10/18 10:00  05/11/18 11:40





  Lasix -  PO   Not Given





  DAILY TINA   


 


Gabapentin  600 mg  05/10/18 22:00  05/11/18 14:23





  Neurontin -  PO   600 mg





  TID TINA   Administration


 


Oxycodone HCl  10 mg  05/10/18 20:16  05/11/18 09:54





  Roxicodone -  PO   10 mg





  Q8H PRN   Administration





  PAIN 7-10   


 


Pantoprazole Sodium  40 mg  05/11/18 10:00  05/11/18 11:40





  Protonix -  PO   Not Given





  DAILY TINA   


 


Quetiapine Fumarate  100 mg  05/10/18 06:00  05/11/18 13:02





  Seroquel -  PO   100 mg





  TID TINA   Administration


 


Sumatriptan Succinate  100 mg  05/11/18 10:00  





  Imitrex -  PO   





  DAILY PRN   





  HEADACHE   


 


Zolpidem Tartrate  10 mg  05/10/18 20:10  05/10/18 21:56





  Ambien -  PO   10 mg





  HS PRN   Administration





  INSOMNIA   














ASSESSMENT AND PLAN:


This is a 53 year old man with a history of HTN, CAD, DVT, bipolar disorder, 

migraines, chronic pain who presented to the ED with chest pain and SOB.  





1. Acute pulmonary embolus


   - Continue Lovenox - plan for discharge on Eliquis or Xarelto


2. LLL lung nodues


   - Pulmonary consult appreciated - Augmentin started, and plan for repeat CT 

in 4-6 weeks


3. Bilateral leg edema


   - Clinically, no evidence of CHF


   - Venous dopplers negative for DVT


   - Echo shows normal LV, normal LVEF, normal LV filling, normal RV, trace NJ


   - Continue Lasix


3. CAD


   - Continue atenolol, Lipitor


4. HTN


   - Continue atenolol, Lasix, Norvasc


5. Bipolar disorder


   - Continue Seroquel


6. Migraine headaches


   - Continue Imitrex as needed


7. Chronic pain


   - Continue Neurontin, oxycodone as needed

## 2018-05-11 NOTE — CONS
DATE OF CONSULTATION:  2018 

 

REFERRING PHYSICIAN:  Jose Sandoval MD 

 

The patient is a 53-year-old white male with a past medical history of ASHD status

post CAB, hypertension, bipolar disorder, depression, migraines, chronic pain, status

post multiple joint surgeries, last shoulder surgery 6 months ago, admitted to [facility] with complaint of 3- to 4-day history of increasing

shortness of breath, dyspnea on exertion, left lower chest pain, nonradiating,

intermittent.  Patient states that about a week or so prior to admission he started

noticing increasing shortness of breath on dyspnea on exertion which he normally does

not have.  Of note is he presented to the emergency room with the above.  In the ER

he underwent a CTA of the chest which revealed a left lower lobe pulmonary embolism

and also small patchy nodular infiltrate in the left mid lung field.  He was admitted

to the floor.  He was started on anticoagulation.  Of note is also the patient states

approximately 2 weeks prior to admission he started developing a cough productive of

yellow sputum.  He denied any fevers, chills, nausea, vomiting, diaphoresis at the

time.  He did not take any medication.  He also states that approximately 4 days

prior to admission he was spitting up some blood.  He states that he thought it may

be he cut his tongue, but he denied any pain in the tongue or recalled biting his

tongue or any injury to his tongue.  Patient is a nonsmoker.  He is a retired

 and currently on disability.  There is no history of recent

travel.  He denies any history of DVT or PE in the past.  He states that his mother

 of a stroke.  Denies any other family history of DVT or PE in the past.  

 

PAST MEDICAL HISTORY:  Includes ASHD status post CAB, hypertension, bipolar disorder

with depression, migraines, chronic pain, status post multiple joint surgeries. 

Questionable DVT in the past in  status post left knee surgery.  This is from the

chart.  Patient did not discuss this with me when I asked him if he had a prior.  

 

REVIEW OF SYSTEMS:  No orthopnea, no PND.  Positive mild chest discomfort.  No cough,

no shortness of breath, no abdominal pain, no lower extremity edema.  

 

CURRENT MEDICATIONS:  Include Tylenol, Lovenox, Neurontin, Seroquel, Ambien, Valium,

Tenormin, Flonase, Norvasc, Lipitor, Lasix, Roxicodone, Protonix, and Imitrex.

 

PHYSICAL EXAMINATION: 

General:  The patient is a well-developed, obese male but awake, alert, in no acute

distress.  

Vital Signs:  He is afebrile.  Blood pressure 102/56.  Respiratory rate is 20.  O2

saturation is 98% on room air.  

HEENT:  His head is normocephalic, atraumatic.  

Neck:  Supple.

Heart:  Regular.  S1, S2. 

Chest:  Clear.

Abdomen:  Soft.  Bowel sounds are positive.  

Extremities:  No cyanosis or edema.  

 

LABORATORY:  WBC is 7.2, hemoglobin 13.8, hematocrit 40.9, with a platelet count of

237,000.  INR is 1.11.  BUN 22, creatinine 1.3.  BNP is 455.  

 

Duplex lower extremities no DVT or PE.  Chest CT is a left lower lobe pulmonary

artery defect.  There are patchy nodular opacities in the left lower lobe.

 

IMPRESSION:  1.  Left lower lobe pulmonary emboli, question provoked versus

unprovoked.  

2.  Patchy nodular opacities in the left lower lobe, possibly inflammatory, possibly

infection.  Patient gives a history of cough and sputum production 2 weeks ago. 

Cannot exclude possible other etiology, possible malignant etiologies.

3.  Bipolar.

4.  Hypertension.

 

PLAN:  Continue anticoagulation.  Workup for hypercoagulable state as outpatient. 

Antibiotic therapy.  Obtain followup chest CT in approximately 4-6 weeks to document

resolution of left lower lobe nodular opacities.  If no change, pursue further

workup, possible PET scan as well as possible biopsy to rule out possible malignant

etiology.  

 

 

AMANDA LOWE M.D.

 

CARINE/[id number]

DD: 2018 11:27

DT: 2018 12:00

Job #:  [id number]

## 2018-05-11 NOTE — PN
Physical Exam: 


SUBJECTIVE: Patient seen and examined








OBJECTIVE:





 Vital Signs











 Period  Temp  Pulse  Resp  BP Sys/Guerra  Pulse Ox


 


 Last 24 Hr  97.6 F-98.0 F  64-79  20-20  /56-70  98-98











GENERAL: The patient is awake, alert, and fully oriented, in no acute distress.


HEAD: Normal with no signs of trauma.


EYES: PERRL, extraocular movements intact, sclera anicteric, conjunctiva clear. 

No ptosis. 


ENT: Ears normal, nares patent, oropharynx clear without exudates, moist mucous 


membranes.


NECK: Trachea midline, full range of motion, supple. 


LUNGS: Breath sounds equal, clear to auscultation bilaterally, no wheezes, no 

crackles, no 


accessory muscle use. 


HEART: Regular rate and rhythm, S1, S2 without murmur, rub or gallop.


ABDOMEN: Soft, nontender, nondistended, normoactive bowel sounds, no guarding, 

no 


rebound, no hepatosplenomegaly, no masses.


EXTREMITIES: 2+ pulses, warm, well-perfused, no edema. 


NEUROLOGICAL: Cranial nerves II through XII grossly intact. Normal speech, gait 

not 


observed.


PSYCH: Normal mood, normal affect.


SKIN: Warm, dry, normal turgor, no rashes or lesions noted














 Laboratory Results - last 24 hr











  05/11/18





  07:40


 


Sodium  140


 


Potassium  4.2


 


Chloride  103


 


Carbon Dioxide  31


 


Anion Gap  6 L


 


BUN  13  D


 


Creatinine  0.9  D


 


Creat Clearance w eGFR  > 60


 


Random Glucose  100


 


Calcium  8.8


 


Total Bilirubin  0.4  D


 


AST  28  D


 


ALT  33


 


Alkaline Phosphatase  99


 


Total Protein  6.5


 


Albumin  3.6








Active Medications











Generic Name Dose Route Start Last Admin





  Trade Name Freq  PRN Reason Stop Dose Admin


 


Acetaminophen  325 mg  05/10/18 20:16  05/11/18 09:55





  Tylenol -  PO   325 mg





  Q8H PRN   Administration





  PAIN 7-10   


 


Amlodipine Besylate  5 mg  05/10/18 10:00  05/11/18 11:40





  Norvasc -  PO   Not Given





  DAILY [facility]   


 


Amoxicillin/Clavulanate Potassium  1 tab  05/11/18 17:30  





  Augmentin - 875mg Tablet  PO   





  BID@0800,1730 [facility]   


 


Atenolol  50 mg  05/10/18 10:00  05/11/18 11:40





  Tenormin -  PO   Not Given





  DAILY [facility]   


 


Atorvastatin Calcium  80 mg  05/11/18 10:00  05/11/18 11:40





  Lipitor -  PO   Not Given





  DAILY [facility]   


 


Diazepam  10 mg  05/10/18 20:48  05/10/18 21:56





  Valium -  PO   10 mg





  TID PRN   Administration





  MUSCLE SPASMS   


 


Enoxaparin Sodium  120 mg  05/10/18 22:00  05/11/18 09:55





  Lovenox -  SQ   120 mg





  BID TINA   Administration


 


Fluticasone Propionate  2 spray  05/11/18 10:00  05/11/18 09:53





  Flonase -  NS   2 sprays





  DAILY [facility]   Administration


 


Furosemide  40 mg  05/10/18 10:00  05/11/18 11:40





  Lasix -  PO   Not Given





  DAILY [facility]   


 


Gabapentin  600 mg  05/10/18 22:00  05/11/18 06:08





  Neurontin -  PO   600 mg





  TID TINA   Administration


 


Oxycodone HCl  10 mg  05/10/18 20:16  05/11/18 09:54





  Roxicodone -  PO   10 mg





  Q8H PRN   Administration





  PAIN 7-10   


 


Pantoprazole Sodium  40 mg  05/11/18 10:00  05/11/18 11:40





  Protonix -  PO   Not Given





  DAILY [facility]   


 


Quetiapine Fumarate  100 mg  05/10/18 06:00  05/11/18 06:08





  Seroquel -  PO   100 mg





  TID TINA   Administration


 


Sumatriptan Succinate  100 mg  05/11/18 10:00  





  Imitrex -  PO   





  DAILY PRN   





  HEADACHE   


 


Zolpidem Tartrate  10 mg  05/10/18 20:10  05/10/18 21:56





  Ambien -  PO   10 mg





  HS PRN   Administration





  INSOMNIA   











ASSESSMENT/PLAN:


53 year r old man with HTN, hx of DVT, presents with left lower chest pain and 

SOB found to have left lung pe.  





Pulmonary embolism: 


-LLL, stable, no tachycardia, BP controlled, likely cause of the chest pain


-started complaining of more pain today. We will obtain EKG, troponins to r/o 

ACS.


-continue Lovenox 120 mg IV BID


-We will follow up Hematology recommendations. The patient has a history of DVT 

in 2008. At that time he was hospitalized in [facility]. He will 

need indefinite AC after the discharge from the hospital.


-Pulmonary consulted. The patient was found to have left sided nodules, 

possibly infectious, but need to r/o malignancy. Recommendation is to start 

antibiotics ( Augumentin) and follow up with CT chest in 4-6 weeks as 

outpatient. 





LE swelling:


-might be due to CHF


-echo: normal LV function, RV function, trace pulm regurg.


-continue lasix 40mg po daily


-Dupplex of lower extremities no acute pathology





HTN:


- continue atenolol 50mg po daily


- cont lasix 40mg po daily,


- cont norvasc 5 daily





Chronic migranes:


-continue Sumatriptan





Hyperlipidemia:


-continue Atorvastatin





GERD:


-cont Omeprazole





Bipolar disorder:


-cont Seroquel





DVT: on lovenox BID





F/E/N:no/no changes/low sodium





Disposition: telemetry monitoring





Problem List





- Problems


(1) Pulmonary embolism


Code(s): I26.99 - OTHER PULMONARY EMBOLISM WITHOUT ACUTE COR PULMONALE   





(2) Hyperlipidemia


Code(s): E78.5 - HYPERLIPIDEMIA, UNSPECIFIED   





(3) Hypertension


Code(s): I10 - ESSENTIAL (PRIMARY) HYPERTENSION   





Visit type





- Emergency Visit


Emergency Visit: Yes


ED Registration Date: 05/10/18


Care time: The patient presented to the Emergency Department on the above date 

and was hospitalized for further evaluation of their emergent condition.





- New Patient


This patient is new to me today: No





- Critical Care


Critical Care patient: No





- Discharge Referral


Referred to [facility] No

## 2018-05-11 NOTE — PN
Progress Note (short form)





- Note


Progress Note: 





PULMONARY





CONSULTATION DICTATED 5/11/18





IMP LLL PULMONARY EMBOLISM ? UNPROVOKED


      LLL NODULAR OPACITIES ?INFECTIOUS,? INFLAMMATORY? MALIGNANT


      CHEST PAIN


      ? H/O DVT 2008


      HTN


      BIPOLAR


      OBESITY





PLAN AC


        W/U FOR HYPERCOAGULABLE STATE


        ABX


        F/U CHEST CT 4-6 WKS TO DOCUMENT RESOLUTION OF LLL NODULAR OPACITIES,IF 

NO CHANGE PET AND POSSIBLE TISSUE DX





DR LOWE





Problem List





- Problems


(1) Pulmonary nodule, left


Code(s): R91.1 - SOLITARY PULMONARY NODULE   





(2) Chest pain


Code(s): R07.9 - CHEST PAIN, UNSPECIFIED   





(3) Pulmonary embolism


Code(s): I26.99 - OTHER PULMONARY EMBOLISM WITHOUT ACUTE COR PULMONALE   





(4) Hypertension


Code(s): I10 - ESSENTIAL (PRIMARY) HYPERTENSION

## 2018-05-12 VITALS — SYSTOLIC BLOOD PRESSURE: 122 MMHG | HEART RATE: 75 BPM | DIASTOLIC BLOOD PRESSURE: 74 MMHG | TEMPERATURE: 98 F

## 2018-05-12 LAB
ALBUMIN SERPL-MCNC: 3.6 G/DL (ref 3.4–5)
ALP SERPL-CCNC: 93 U/L (ref 45–117)
ALT SERPL-CCNC: 37 U/L (ref 12–78)
ANION GAP SERPL CALC-SCNC: 7 MMOL/L (ref 8–16)
AST SERPL-CCNC: 35 U/L (ref 15–37)
BILIRUB SERPL-MCNC: 0.7 MG/DL (ref 0.2–1)
BUN SERPL-MCNC: 12 MG/DL (ref 7–18)
CALCIUM SERPL-MCNC: 9.1 MG/DL (ref 8.5–10.1)
CHLORIDE SERPL-SCNC: 105 MMOL/L (ref 98–107)
CO2 SERPL-SCNC: 29 MMOL/L (ref 21–32)
CREAT SERPL-MCNC: 0.9 MG/DL (ref 0.7–1.3)
GLUCOSE SERPL-MCNC: 112 MG/DL (ref 74–106)
POTASSIUM SERPLBLD-SCNC: 4.3 MMOL/L (ref 3.5–5.1)
PROT SERPL-MCNC: 6.6 G/DL (ref 6.4–8.2)
SODIUM SERPL-SCNC: 141 MMOL/L (ref 136–145)

## 2018-05-12 RX ADMIN — FLUTICASONE PROPIONATE SCH SPRAYS: 50 SPRAY, METERED NASAL at 08:59

## 2018-05-12 RX ADMIN — FUROSEMIDE SCH MG: 40 TABLET ORAL at 08:59

## 2018-05-12 RX ADMIN — AMOXICILLIN AND CLAVULANATE POTASSIUM SCH TAB: 875; 125 TABLET, FILM COATED ORAL at 08:15

## 2018-05-12 RX ADMIN — PANTOPRAZOLE SODIUM SCH MG: 40 TABLET, DELAYED RELEASE ORAL at 09:00

## 2018-05-12 RX ADMIN — ATENOLOL SCH MG: 50 TABLET ORAL at 09:00

## 2018-05-12 RX ADMIN — QUETIAPINE FUMARATE SCH MG: 100 TABLET ORAL at 06:33

## 2018-05-12 RX ADMIN — AMLODIPINE BESYLATE SCH MG: 5 TABLET ORAL at 09:00

## 2018-05-12 RX ADMIN — QUETIAPINE FUMARATE SCH MG: 100 TABLET ORAL at 08:56

## 2018-05-12 RX ADMIN — ACETAMINOPHEN PRN MG: 325 TABLET ORAL at 06:33

## 2018-05-12 RX ADMIN — GABAPENTIN SCH MG: 300 CAPSULE ORAL at 06:33

## 2018-05-12 RX ADMIN — ATORVASTATIN CALCIUM SCH MG: 80 TABLET, FILM COATED ORAL at 09:00

## 2018-05-12 NOTE — EKG
Test Reason : 

Blood Pressure : ***/*** mmHG

Vent. Rate : 064 BPM     Atrial Rate : 064 BPM

   P-R Int : 180 ms          QRS Dur : 084 ms

    QT Int : 402 ms       P-R-T Axes : 025 -03 021 degrees

   QTc Int : 414 ms

 

NORMAL SINUS RHYTHM

NORMAL ECG

WHEN COMPARED WITH ECG OF 09-MAY-2018 20:33,

NO SIGNIFICANT CHANGE WAS FOUND

Confirmed by CARL IRVING MD ([id number]) on 5/12/2018 8:21:04 AM

 

Referred By:             Confirmed By:CARL IRVING MD

## 2018-05-12 NOTE — DS
Physical Exam: 


SUBJECTIVE: Patient seen and examined. having pin point L sided chest pain. 

states has persisted since yesterday and is worse when he rubs the area. denies 

SOB, fever, chills, cough, hemoptysis, N/V/C/D








OBJECTIVE:





 Vital Signs











 Period  Temp  Pulse  Resp  BP Sys/Guerra  Pulse Ox


 


 Last 24 Hr  97.4 F-98.0 F  64-84  18-22  /48-74  96-99








PHYSICAL EXAM





GENERAL: The patient is awake, alert, and fully oriented,mildly anxious


HEAD: Normal with no signs of trauma.


EYES: PERRL, extraocular movements intact, sclera anicteric, conjunctiva clear. 


ENT: Ears normal, nares patent, oropharynx clear without exudates, moist mucous 

membranes.


NECK: Trachea midline, full range of motion, supple. 


LUNGS: Breath sounds equal, clear to auscultation bilaterally, no wheezes, no 

crackles, no accessory muscle use. 


HEART: Regular rate and rhythm, S1, S2 without murmur, rub or gallop. point 

tenderness L of sternal border


ABDOMEN: Soft, nontender, nondistended, normoactive bowel sounds, no guarding, 

no rebound, no hepatosplenomegaly, no masses.


EXTREMITIES: 2+ pulses, warm, well-perfused, no edema. 


NEUROLOGICAL: Cranial nerves II through XII grossly intact. Normal speech, gait 

not observed.


PSYCH: Normal mood, normal affect.


SKIN: Warm, dry, normal turgor, no rashes or lesions noted.





LABS


 Laboratory Results - last 24 hr











  05/11/18 05/11/18 05/12/18





  12:53 19:00 06:33


 


Sodium    141


 


Potassium    4.3


 


Chloride    105


 


Carbon Dioxide    29


 


Anion Gap    7 L


 


BUN    12


 


Creatinine    0.9


 


Creat Clearance w eGFR    > 60


 


Random Glucose    112 H


 


Calcium    9.1


 


Total Bilirubin    0.7  D


 


AST    35  D


 


ALT    37


 


Alkaline Phosphatase    93


 


Troponin I  < 0.02  < 0.02 


 


Total Protein    6.6


 


Albumin    3.6











HOSPITAL COURSE:





Date of Admission:05/10/18





Date of Discharge: 05/12/18








ADmitting diagnosis: PE, LLL nodule





Pre hospital course


53 yr old man with hx of CAD(s/p cath), HTN, bipolar d.o with depression, 

migraines, chronic pain, s/p multiple joint surgeries presented to ED c/o left 

lower chest, nonradiating, intermittent, nonpositional, non-reproducible, a/w 

exertional dyspnea for one day. a/w with left hand tingling and swelling. Chest 

pain improved with rest and hand swelling and tingling improves with massage.


 Also a/w increased lower extremity swelling, worse on the left leg for the 

past 7 days. He self increased his lasix to 40mg po BID for past few days, even 

though his PCP did not think he needed it, he felt he did but the swelling did 

not improve with the increased dose. 


says an echo about 2 months in Dr. Duffy's office was normal. stress test 

about 4 yrs ago was normal.


denies prolonged immobility in the last week.





Subsequent hospital course


Admitted to tele. CTA showed PE and LLL nodules. echo done not showing any 

heart strain. started on lovenox. seen by hematology and pulmonary. switched to 

Xarelto. continued to c/o of CP. cardiac enzymes neg x2 with no events noted on 

the monitor. area was tender and slightly red from patient constantly rubbing 

the area. pt appeared anxious given his PE and further workup. educated him on 

diagnosis and further workup. pt appeared more relax with counselling. stressed 

importance of compliance and follow up. verbalized understanding. d/c home. (

confirmed with pharmacy that Xarelto was covered)


Minutes to complete discharge: 40





Discharge Summary


Reason For Visit: PULMONARY EMBOLISM


Current Active Problems





Chest pain (Acute)


Pulmonary embolism (Acute)


Pulmonary nodule, left (Acute)


Hypertension (Chronic)








Condition: Good





- Instructions


Diet, Activity, Other Instructions: 


You were admitted to the hospital for pulmonary embolism and treated with 

Lovenox. After consulting with hematologist, we would like you to continue 

taking Xarelto for 6 months. You took your first dose this AM. you will need to 

 the prescription (which is ready for pickup already) for your dose 

tonight. Start with 15 mg twice a day for 21 days. Then Continue 20 mg for 5 

months and 1 week to complete full course. Please take your medications with 

food.





Please follow up with your primary care physician in a week. You should have a 

repeat CT scan of your lungs in 6 weeks to follow up nodules seen here in the 

hospital.





Follow up with the hematologist in 1 month. You will need to be evaluated to 

see why you developed a clot.





Continue taking all other medications that you were taking before coming to the 

hospital.


For the swelling in your legs, elevate your feet after standing for long 

periods of time. 








If you experience severe chest pain, shortness of breath, palpitations, 

dizziness, leg swelling, pain, or worsening of any of your symptoms, come back 

to emergency room as soon as possible.


Referrals: 


Ana Maria Villatoro MD [Staff Physician] - 


Richie Duffy [Non Staff, Medical] - 


Disposition: HOME





- Home Medications


Comprehensive Discharge Medication List: 


Ambulatory Orders





Atenolol [Tenormin -] 25 mg PO BID 07/11/13 


Diazepam [Valium -] 10 mg PO TID PRN 07/11/13 


Sumatriptan [Imitrex] 100 mg PO DAILY PRN 07/11/13 


Amlodipine Besylate [Norvasc -] 5 mg PO DAILY 10/06/17 


Atorvastatin Ca [Lipitor] 80 mg PO DAILY 10/06/17 


Fluticasone Prop 0.05% Nasal [Flonase -] 3 spray NS DAILY 10/06/17 


Omeprazole 40 mg PO DAILY 10/06/17 


Oxycodone HCl/Acetaminophen [Percocet  mg Tablet] 1 each PO TID PRN 10/06/

17 


Quetiapine Fumarate [Seroquel] 100 mg PO TID 10/06/17 


Zolpidem Tartrate [Ambien] 10 mg PO HS PRN 10/06/17 


Gabapentin 600 mg PO TID 05/10/18 


Furosemide [Lasix -] 40 mg PO DAILY  tablet 05/11/18 


Rivaroxaban [Xarelto -] 15 mg PO BID 21 Days #42 tab 05/11/18 


Rivaroxaban [Xarelto -] 20 mg PO DAILY 157 Days #157 tablet 05/11/18 








This patient is new to me today: Yes


Date on this admission: 05/12/18


Emergency Visit: Yes


ED Registration Date: 05/10/18


Care time: The patient presented to the Emergency Department on the above date 

and was hospitalized for further evaluation of their emergent condition.


Critical Care patient: No





- Discharge Referral


Referred to [facility] No

## 2018-05-15 ENCOUNTER — HOSPITAL ENCOUNTER (EMERGENCY)
Dept: HOSPITAL 74 - JER | Age: 53
Discharge: HOME | End: 2018-05-15
Payer: COMMERCIAL

## 2018-05-15 VITALS — BODY MASS INDEX: 36.2 KG/M2

## 2018-05-15 VITALS — HEART RATE: 79 BPM | DIASTOLIC BLOOD PRESSURE: 83 MMHG | SYSTOLIC BLOOD PRESSURE: 128 MMHG

## 2018-05-15 VITALS — TEMPERATURE: 98.1 F

## 2018-05-15 DIAGNOSIS — R07.89: Primary | ICD-10-CM

## 2018-05-15 DIAGNOSIS — K21.9: ICD-10-CM

## 2018-05-15 DIAGNOSIS — F31.9: ICD-10-CM

## 2018-05-15 DIAGNOSIS — G89.29: ICD-10-CM

## 2018-05-15 DIAGNOSIS — Z79.01: ICD-10-CM

## 2018-05-15 DIAGNOSIS — I25.10: ICD-10-CM

## 2018-05-15 DIAGNOSIS — Z86.711: ICD-10-CM

## 2018-05-15 DIAGNOSIS — I10: ICD-10-CM

## 2018-05-15 DIAGNOSIS — Z95.5: ICD-10-CM

## 2018-05-15 LAB
ALBUMIN SERPL-MCNC: 4.1 G/DL (ref 3.4–5)
ALP SERPL-CCNC: 103 U/L (ref 45–117)
ALT SERPL-CCNC: 81 U/L (ref 12–78)
ANION GAP SERPL CALC-SCNC: 6 MMOL/L (ref 8–16)
APPEARANCE UR: CLEAR
AST SERPL-CCNC: 46 U/L (ref 15–37)
BASOPHILS # BLD: 0.9 % (ref 0–2)
BILIRUB SERPL-MCNC: 0.4 MG/DL (ref 0.2–1)
BILIRUB UR STRIP.AUTO-MCNC: NEGATIVE MG/DL
BNP SERPL-MCNC: 46.78 PG/ML (ref 5–125)
BUN SERPL-MCNC: 14 MG/DL (ref 7–18)
CALCIUM SERPL-MCNC: 8.8 MG/DL (ref 8.5–10.1)
CHLORIDE SERPL-SCNC: 102 MMOL/L (ref 98–107)
CO2 SERPL-SCNC: 31 MMOL/L (ref 21–32)
COLOR UR: YELLOW
CREAT SERPL-MCNC: 1.1 MG/DL (ref 0.7–1.3)
DEPRECATED RDW RBC AUTO: 15.4 % (ref 11.9–15.9)
EOSINOPHIL # BLD: 0.7 % (ref 0–4.5)
GLUCOSE SERPL-MCNC: 92 MG/DL (ref 74–106)
HCT VFR BLD CALC: 47.6 % (ref 35.4–49)
HGB BLD-MCNC: 15.3 GM/DL (ref 11.7–16.9)
INR BLD: 1.9 (ref 0.82–1.09)
KETONES UR QL STRIP: NEGATIVE
LEUKOCYTE ESTERASE UR QL STRIP.AUTO: NEGATIVE
LIPASE SERPL-CCNC: 63 U/L (ref 73–393)
LYMPHOCYTES # BLD: 26.4 % (ref 8–40)
MAGNESIUM SERPL-MCNC: 2.1 MG/DL (ref 1.8–2.4)
MCH RBC QN AUTO: 28.7 PG (ref 25.7–33.7)
MCHC RBC AUTO-ENTMCNC: 32.2 G/DL (ref 32–35.9)
MCV RBC: 89.1 FL (ref 80–96)
MONOCYTES # BLD AUTO: 8 % (ref 3.8–10.2)
NEUTROPHILS # BLD: 64 % (ref 42.8–82.8)
NITRITE UR QL STRIP: NEGATIVE
PH UR: 6 [PH] (ref 5–8)
PLATELET # BLD AUTO: 272 K/MM3 (ref 134–434)
PMV BLD: 8.6 FL (ref 7.5–11.1)
POTASSIUM SERPLBLD-SCNC: 3.8 MMOL/L (ref 3.5–5.1)
PROT SERPL-MCNC: 7.6 G/DL (ref 6.4–8.2)
PROT UR QL STRIP: NEGATIVE
PROT UR QL STRIP: NEGATIVE
PT PNL PPP: 21.5 SEC (ref 9.7–13)
RBC # BLD AUTO: 5.34 M/MM3 (ref 4–5.6)
SODIUM SERPL-SCNC: 139 MMOL/L (ref 136–145)
SP GR UR: 1.02 (ref 1–1.03)
UROBILINOGEN UR STRIP-MCNC: NEGATIVE MG/DL (ref 0.2–1)
WBC # BLD AUTO: 13.8 K/MM3 (ref 4–10)

## 2018-05-15 PROCEDURE — 3E033NZ INTRODUCTION OF ANALGESICS, HYPNOTICS, SEDATIVES INTO PERIPHERAL VEIN, PERCUTANEOUS APPROACH: ICD-10-PCS | Performed by: STUDENT IN AN ORGANIZED HEALTH CARE EDUCATION/TRAINING PROGRAM

## 2018-05-15 NOTE — EKG
Test Reason : 

Blood Pressure : ***/*** mmHG

Vent. Rate : 057 BPM     Atrial Rate : 057 BPM

   P-R Int : 178 ms          QRS Dur : 088 ms

    QT Int : 426 ms       P-R-T Axes : 016 -10 034 degrees

   QTc Int : 414 ms

 

*** POOR DATA QUALITY, INTERPRETATION MAY BE ADVERSELY AFFECTED

SINUS BRADYCARDIA

OTHERWISE NORMAL ECG

WHEN COMPARED WITH ECG OF 11-MAY-2018 12:39,

NO SIGNIFICANT CHANGE WAS FOUND

Confirmed by MD AMALIA, ANJELICA ([id number]) on 5/15/2018 11:52:03 AM

 

Referred By:             Confirmed By:ANJELICA HOLLAND MD

## 2018-05-15 NOTE — PDOC
Attending Attestation





- Resident


Resident Name: Kristin Graves





- ED Attending Attestation


I have performed the following: I have examined & evaluated the patient, The 

case was reviewed & discussed with the resident, I agree w/resident's findings 

& plan, Exceptions are as noted





- HPI


HPI: 





05/15/18 04:51


53y M hx of PE dx on 5/10, w/o signs of heart strain, on xeralto, anxiety, 

bipolar dissorder was dc 3 days ago and has been having increased anxiety, 

palipitations and pain associated with nausea and a few episodes nbnb vomiting. 

pt has been having palpitations, lightheadedness, and mild L sided cp that is 

worse when he pushes onhis chest - these symptoms have been going on since 

prior to discharge. pt notes that the symptoms come and go and can occur when 

he is in bed and also when he is ambulating. pt notes that he feels beter after 

taking his anxiety meds typically. he has largely spent his time in his small 

room thinking about his disease and worried about what could happen. dnies any 

leg swleling, hemoptysis, consistent ANGELES or CP on exertion.  








GENERAL: The patient is awake, alert, and fully oriented, Nontoxic - in no 

acute distress.


HEAD: Normocephalic, atraumatic.


EYES: extraocular movements intact, sclera anicteric, conjunctiva clear.


ENT: Normal voice,  Moist mucous membranes.


NECK: Normal range of motion, supple


LUNGS: Breath sounds equal, clear to auscultation bilaterally.  No wheezes, no 

rhonchi, no rales.


HEART: Regular rate and rhythm, normal S1 and S2 without murmur, rub or gallop.


ABDOMEN: Soft, nontender, normoactive bowel sounds.  No guarding, no rebound.  

. No CVA tenderness


EXTREMITIES: Normal range of motion, no edema.  No clubbing or cyanosis. No 

cords, erythema, or tenderness.


NEUROLOGICAL: No facial assymetry, Normal speech, 


PSYCH: Normal mood, normal affect.


SKIN: Warm, Dry, normal turgor, focal reducible tenderness to the left chest 

approx 4th ICS








Suspect that the patient's symptoms is secondary to anxiety, will obtain 

troponin x 2, BMP to r/o heart strain


Will obtain EKG to screen for changes











- Physicial Exam


PE: 





05/19/18 20:07


see above





- Medical Decision Making





05/15/18 07:21


pt feeling improved after quetiapine


trop neg x 1


bnp pending


will ck repeat trop at 9am


if neg will dc with pmd fu


no ekg changes suggestive of right heart strain





**Heart Score/ECG Review





- ECG Impressions


Comment:: 





05/15/18 07:01


Twelve-lead EKG was performed and reviewed by me. 


There is normal sinus rhythm with a rate of 57


The axis is normal. 


The intervals are normal. 


There is normal R wave progression


There are no ST or T wave abnormalities.





Impression: Sinus bradycardia

## 2018-05-15 NOTE — PDOC
History of Present Illness





- General


Chief Complaint: Shortness of Breath


Stated Complaint: SHORTNESS OF BREATH


Time Seen by Provider: 05/15/18 04:11


History Source: Patient


Exam Limitations: No Limitations





- History of Present Illness


Initial Comments: 


This is a 53 YOM with h/o recently diagnosed PE, CAD (s/p cath), HTN, bipolar 

disorder, depression, chronic pain, multiple joint injuries and surgeries, and 

migraines, who p/w worsened left lower chest pain, nausea, NBNB vomiting, and 

inability to sleep for the past several days since recent discharge from [facility]. 

He was admitted to [facility] from our ED from 5/10/18-5/12/18 for acute PE without 

right heart strain on echo, and LLL pulmonary nodule. He was started on Lovenox 

and then switched to longer-term Xarelto at that time. Since discharge home he 

has had worsening and migrating sharp 10/10 left lower chest pain radiating to 

his left shoulder. He has not taken any medications for the pain. He 

additionally notes generalized malaise, nausea, several episodes of NBNB 

vomiting, inability to sleep, bilateral hand and feet cramping and tingling, 

and burning on urination. He additionally notes worsened anxiety. His discharge 

medication list from 5/12/18 discharge is below and he notes adherence to this.





Atenolol [Tenormin -] 25 mg PO BID 07/11/13 


Diazepam [Valium -] 10 mg PO TID PRN 07/11/13 


Sumatriptan [Imitrex] 100 mg PO DAILY PRN 07/11/13 


Amlodipine Besylate [Norvasc -] 5 mg PO DAILY 10/06/17 


Atorvastatin Ca [Lipitor] 80 mg PO DAILY 10/06/17 


Fluticasone Prop 0.05% Nasal [Flonase -] 3 spray NS DAILY 10/06/17 


Omeprazole 40 mg PO DAILY 10/06/17 


Oxycodone HCl/Acetaminophen [Percocet  mg Tablet] 1 each PO TID PRN 10/06/

17 


Quetiapine Fumarate [Seroquel] 100 mg PO TID 10/06/17 


Zolpidem Tartrate [Ambien] 10 mg PO HS PRN 10/06/17 


Gabapentin 600 mg PO TID 05/10/18 


Furosemide [Lasix -] 40 mg PO DAILY  tablet 05/11/18 


Rivaroxaban [Xarelto -] 15 mg PO BID 21 Days #42 tab 05/11/18 


Rivaroxaban [Xarelto -] 20 mg PO DAILY 157 Days #157 tablet 05/11/18 





Past History





- Past Medical History


Allergies/Adverse Reactions: 


 Allergies











Allergy/AdvReac Type Severity Reaction Status Date / Time


 


morphine Allergy  Rash Verified 05/15/18 04:10


 


meperidine HCl [From Demerol] AdvReac   Verified 05/15/18 04:10











Home Medications: 


Ambulatory Orders





Atenolol [Tenormin -] 25 mg PO BID 07/11/13 


Diazepam [Valium -] 10 mg PO TID PRN 07/11/13 


Sumatriptan [Imitrex] 100 mg PO DAILY PRN 07/11/13 


Amlodipine Besylate [Norvasc -] 5 mg PO DAILY 10/06/17 


Atorvastatin Ca [Lipitor] 80 mg PO DAILY 10/06/17 


Fluticasone Prop 0.05% Nasal [Flonase -] 3 spray NS DAILY 10/06/17 


Omeprazole 40 mg PO DAILY 10/06/17 


Oxycodone HCl/Acetaminophen [Percocet  mg Tablet] 1 each PO TID PRN 10/06/

17 


Quetiapine Fumarate [Seroquel] 100 mg PO TID 10/06/17 


Zolpidem Tartrate [Ambien] 10 mg PO HS PRN 10/06/17 


Gabapentin 600 mg PO TID 05/10/18 


Furosemide [Lasix -] 40 mg PO DAILY  tablet 05/11/18 


Rivaroxaban [Xarelto -] 15 mg PO BID 21 Days #42 tab 05/11/18 


Rivaroxaban [Xarelto -] 20 mg PO DAILY 157 Days #157 tablet 05/11/18 








Cardiac Disorders: Yes (RAPID HEARTBEAT)


COPD: No


GI Disorders: Yes (GERD)


HTN: Yes


Hypercholesterolemia: Yes


Seizures: Yes (BIPOLAR)


Other medical history: Sleep apnea





- Surgical History


Abdominal Surgery: Yes (bilat hernia)


Appendectomy: Yes





- Suicide/Smoking/Psychosocial Hx


Smoking Status: No


Smoking History: Never smoked


Have you smoked in the past 12 months: No


Number of Cigarettes Smoked Daily: 0


Information on smoking cessation initiated: No


Hx Alcohol Use: No


Drug/Substance Use Hx: No


Substance Use Type: None





**Review of Systems





- Review of Systems


Able to Perform ROS?: Yes


Constitutional: Yes: Chills, Malaise.  No: Unexplained wgt Loss


HEENTM: No: Nose Congestion, Throat Pain


Respiratory: Yes: Shortness of Breath (ANGELES).  No: Cough


Cardiac (ROS): Yes: Chest Pain, Palpitations (resolved)


ABD/GI: Yes: Nausea, Vomiting.  No: Constipated, Diarrhea


: Yes: Dysuria.  No: Discharge, Hematuria


Musculoskeletal: No: Back Pain, Neck Pain


Integumentary: No: Bruising, Rash


Neurological: Yes: Tingling.  No: Headache, Numbness, Weakness, Dizziness


Endocrine: No: Unexplained Weight Gain, Unexplained Weight Loss





*Physical Exam





- Vital Signs


 Last Vital Signs











Temp Pulse Resp BP Pulse Ox


 


 98.0 F   91 H  19   114/70   100 


 


 05/15/18 04:05  05/15/18 04:05  05/15/18 04:05  05/15/18 04:05  05/15/18 04:05














- Physical Exam


General Appearance: Yes: Nourished, Obese, Other (pleasant adult male who 

appears anxious, very conversive, answering questions appropriately).  No: 

Apparent Distress


HEENT: positive: EOMI, ANDREE, Normal ENT Inspection, Normal Voice, Hearing 

Grossly Normal.  negative: Scleral Icterus (R), Scleral Icterus (L), Nasal 

Congestion


Neck: positive: Trachea midline, Supple.  negative: Tender, Rigid


Respiratory/Chest: positive: Lungs Clear, Normal Breath Sounds.  negative: 

Respiratory Distress, Crackles, Rhonchi, Stridor, Wheezing


Cardiovascular: positive: Regular Rhythm, Regular Rate, S1, S2.  negative: Edema

, JVD, Murmur


Gastrointestinal/Abdominal: positive: Normal Bowel Sounds, Soft, Protuberent.  

negative: Tender, Organomegaly, Pulsatile Mass, Guarding


Musculoskeletal: positive: Normal Inspection.  negative: Decreased Range of 

Motion, Vertebral Tenderness


Extremity: positive: Normal Capillary Refill, Normal Inspection, Normal Range 

of Motion.  negative: Tender, Cyanosis


Integumentary: positive: Normal Color, Dry, Warm.  negative: Erythema, Rash, 

Bruising


Neurologic: positive: CNs II-XII NML intact (grossly), Fully Oriented, Alert, 

Normal Mood/Affect, Normal Response, Motor Strength 5/5





**Heart Score/ECG Review





- History


History: Slightly suspicious





- Age


Age: 45-65





- Risk Factors


Risk Factors Heart Score: Yes Hx Hypercholesterolemia, Yes Hx Hypertension, Yes 

Smoking History, Yes Hx Obesity


Based on the list above the patient has:: >/=3 risk factors or Hx 

atherosclerotic disease





- Troponin


Troponin: </= normal limit





ED Treatment Course





- LABORATORY


CBC & Chemistry Diagram: 


 05/15/18 05:09





 05/15/18 05:09





- ADDITIONAL ORDERS


Additional order review: 


 Laboratory  Results











  05/15/18 05/15/18





  05:09 05:09


 


PT with INR   21.50 H


 


INR   1.90 H D


 


Sodium  139 


 


Potassium  3.8 


 


Chloride  102 


 


Carbon Dioxide  31 


 


Anion Gap  6 L 


 


BUN  14 


 


Creatinine  1.1  D 


 


Creat Clearance w eGFR  > 60 


 


Random Glucose  92 


 


Calcium  8.8 


 


Magnesium  2.1 


 


Total Bilirubin  0.4  D 


 


AST  46 H D 


 


ALT  81 H D 


 


Alkaline Phosphatase  103 


 


Creatine Kinase  170 


 


Troponin I  < 0.02 


 


Total Protein  7.6 


 


Albumin  4.1 








 











  05/15/18





  05:09


 


RBC  5.34


 


MCV  89.1


 


MCHC  32.2


 


RDW  15.4


 


MPV  8.6


 


Neutrophils %  64.0  D


 


Lymphocytes %  26.4  D


 


Monocytes %  8.0


 


Eosinophils %  0.7  D


 


Basophils %  0.9














- RADIOLOGY


Radiology Studies Ordered: 














 Category Date Time Status


 


 CHEST X-RAY PORTABLE* [RAD] Stat Radiology  05/15/18 04:41 Taken














- Medications


Given in the ED: 


ED Medications














Discontinued Medications














Generic Name Dose Route Start Last Admin





  Trade Name Freq  PRN Reason Stop Dose Admin


 


Acetaminophen  1,000 mg  05/15/18 04:41  05/15/18 05:24





  Ofirmev Injection -  IVPB  05/15/18 04:42  1,000 mg





  ONCE ONE   Administration


 


Aspirin  162 mg  05/15/18 04:40  05/15/18 05:24





  Asa -  PO  05/15/18 04:41  Not Given





  ONCE ONE   


 


Quetiapine Fumarate  100 mg  05/15/18 05:32  05/15/18 05:40





  Seroquel -  PO  05/15/18 05:33  100 mg





  ONCE ONE   Administration














Medical Decision Making





- Medical Decision Making


Adult patient p/w left lower chest pain several days after diagnosis of PE and 

starting on Xarelto.


Also with nausea, vomiting, dysuria, chills, muscle cramps, difficulty sleeping

, anxiety.








 Initial Vital Signs











Temp Pulse Resp BP Pulse Ox


 


 98.0 F   91 H  19   114/70   100 


 


 05/15/18 04:05  05/15/18 04:05  05/15/18 04:05  05/15/18 04:05  05/15/18 04:05











Exam: Anxious, A/Ox4, 


DDX IBNLT: ACS, pericarditis, tamponade, aortic dissection, AAA, PTX, PE, 

esophageal tear, esophagitis (e.g. pill, infectious), esophageal stricture, 

esophageal FB, gastritis, PUD, pancreatitis, cholecystitis, cholangitis, colitis

, bowel perforation, PNA/bronchitis, musculoskeletal, etc.


W/U ordered: CBCD CMP Mg Phos Lipase Troponin CK CKMB Coags T&S UA UCx EKG CXR.


TX ordered: monitor, , NTG, Morphine, O2 via NC.





Unlikely ACS as patient has few risk factors, no associated SOB or typical arm/

neck radiation.


Unlikely pericarditis as pain is not relieved sitting forward, patient afebrile

, no friction rub.


Unlikely tamponade as patient has no triad of hypotension/JVD/muffled heart 

sounds.


Unlikely AD as no ripping/tearing sensation, BP not concerningly elevated, 

radial pulses equal bilaterally.


Unlikely AAA as pt has no midline pulsatile mass or h/o AAA/AD, denies HTN or 

connective tissue d/o.


Unlikely pneumothorax as VS are wnl, patient has no recent h/o trauma or falls, 

not SOB.


Unlikely PE as patient is low-risk according to Wells and Perc tools, no 

hypoxia or tachycardia or SOB.


Unlikely esophageal tear (Margarita-Barton/Boerhaave) as pt denies recent EtOH or 

heavy vomiting/wretching.


Unlikely esophagitis as pt denies heavy NSAIDs or pills soon before onset, no 

immunocompromise/steroids.


Unlikely esophageal stricture as the patient is still able to keep down solids 

per usual, no prior hx.


Unlikely esophageal FB as patient denies having eaten just prior to onset, no 

stuck sensation.


Unlikely gastritis as pt denies h/o significant GERD, no overuse of EtOH.


Unlikely PUD as pt does not report relief of pain ~2 hours postprandially or 

with antacids.


Unlikely pancreatitis as pt denies EtOH use, h/o gallstones, no known 

hypercalcemia.


Unlikely cholecystitis as no postprandial worsening.


Unlikely cholangitis as pt has no fever, clinical jaundice, RUQ pain; also no 

hypotension or AMS.


Unlikely colitis as clinically pts abdomen is not distended/no ascites, no 

fever, other VS wnl.


Unlikely bowel perforation as patient does not appear to have acute abdomen, no 

peritoneal signs.


Unlikely PNA/bronchitis as patient has no SOB, cough, fever, known exposures, h/

o recurrent PNA, etc.





EKG: 


CXR: 


Labs: 


HEART score: 


Repeat VS: 


Reassessment: 





ADMIT


Repeat cardiac enzymes ordered.


HEART score indicated patient is higher risk and should be managed in hospital 

with cardiology consult.


The patient is unsafe for discharge at this time.


They require further hospital observation, workup, and treatment.


Microblog sent to [facility] for admission.


Spoke with [facility], in agreement patient to be admitted to: Telemetry, XXXX


Decision to Admit order placed to [facility] covering attending.


Consult order placed to cardiology on-call provider.





DISCHARGE


Repeat cardiac enzymes are negative.


No new abnormal rhythms have been observed on the cardiac monitor.


On last reassessment VS are stable, patients pain is resolved, and exam is 

benign.


The patients HEART score indicates they are low risk and do not require 

admission currently.


The patient is appropriate for discharge with close outpatient follow up.


They are comfortable with this plan and will follow up with their PCP in 1-3 

days.


They will follow up with their regular doctor in the next 1-3 days.


Return precautions are discussed and they will come back to the ER if necessary.





*DC/Admit/Observation/Transfer





- Discharge Dispostion


Condition at time of disposition: Fair





- Referrals


Referrals: 


ON STAFF,NOT [Primary Care Provider] - 





- Patient Instructions





- Post Discharge Activity

## 2018-05-15 NOTE — PDOC
*Physical Exam





- Vital Signs


 Last Vital Signs











Temp Pulse Resp BP Pulse Ox


 


 98.0 F   91 H  19   114/70   100 


 


 05/15/18 04:05  05/15/18 04:05  05/15/18 04:05  05/15/18 04:05  05/15/18 04:05














ED Treatment Course





- LABORATORY


CBC & Chemistry Diagram: 


 05/15/18 05:09





 05/15/18 05:09





- ADDITIONAL ORDERS


Additional order review: 


 Laboratory  Results











  05/15/18 05/15/18 05/15/18





  06:10 05:09 05:09


 


PT with INR    21.50 H


 


INR    1.90 H D


 


Sodium   139 


 


Potassium   3.8 


 


Chloride   102 


 


Carbon Dioxide   31 


 


Anion Gap   6 L 


 


BUN   14 


 


Creatinine   1.1  D 


 


Creat Clearance w eGFR   > 60 


 


Random Glucose   92 


 


Calcium   8.8 


 


Magnesium   2.1 


 


Total Bilirubin   0.4  D 


 


AST   46 H D 


 


ALT   81 H D 


 


Alkaline Phosphatase   103 


 


Creatine Kinase   170 


 


Troponin I   < 0.02 


 


Total Protein   7.6 


 


Albumin   4.1 


 


Urine Color  Yellow  


 


Urine Appearance  Clear  


 


Urine pH  6.0  


 


Ur Specific Gravity  1.025  


 


Urine Protein  Negative  


 


Urine Glucose (UA)  Negative  


 


Urine Ketones  Negative  


 


Urine Blood  Negative  


 


Urine Nitrite  Negative  


 


Urine Bilirubin  Negative  


 


Urine Urobilinogen  Negative  


 


Ur Leukocyte Esterase  Negative  








 











  05/15/18





  05:09


 


RBC  5.34


 


MCV  89.1


 


MCHC  32.2


 


RDW  15.4


 


MPV  8.6


 


Neutrophils %  64.0  D


 


Lymphocytes %  26.4  D


 


Monocytes %  8.0


 


Eosinophils %  0.7  D


 


Basophils %  0.9














- Medications


Given in the ED: 


ED Medications














Discontinued Medications














Generic Name Dose Route Start Last Admin





  Trade Name Freq  PRN Reason Stop Dose Admin


 


Acetaminophen  1,000 mg  05/15/18 04:41  05/15/18 05:24





  Ofirmev Injection -  IVPB  05/15/18 04:42  1,000 mg





  ONCE ONE   Administration


 


Aspirin  162 mg  05/15/18 04:40  05/15/18 05:24





  Asa -  PO  05/15/18 04:41  Not Given





  ONCE ONE   


 


Quetiapine Fumarate  100 mg  05/15/18 05:32  05/15/18 05:40





  Seroquel -  PO  05/15/18 05:33  100 mg





  ONCE ONE   Administration














Medical Decision Making





- Medical Decision Making


52 yo man recently admitted for LLL subsegmental PE presenting with L lower 

chest pain. Sign out provided by Dr. Graves. Will follow-up 2nd trop, with 

likely d/c home. 


05/15/18 07:22








*DC/Admit/Observation/Transfer


Diagnosis at time of Disposition: 


 Pleuritic pain








- Discharge Dispostion


Disposition: HOME


Condition at time of disposition: Fair


Decision to Admit order: No





- Prescriptions


Prescriptions: 


Amlodipine Besylate [Norvasc -] 5 mg PO DAILY #30 tablet


Atenolol [Tenormin -] 25 mg PO BID #60 tablet


Furosemide [Lasix -] 40 mg PO DAILY #30 tablet


Gabapentin 600 mg PO TID #90 tablet


Oxycodone HCl/Acetaminophen [Percocet  mg Tablet] 1 each PO TID #21 

tablet MDD 3 tabs


Rivaroxaban [Xarelto -] 15 mg PO BID 21 Days #42 tab





- Referrals


Referrals: 


ON STAFF,NOT [Non Staff, Medical] - 





- Patient Instructions


Printed Discharge Instructions:  DI for Pleurisy


Additional Instructions: 


During your visit to the [facility] ED, you were evaluated for pain in your chest. 

You received an EKG and standard lab tests, which were all unremarkable. Your 

pain is likely secondary to the natural resolution of your prior pulmonary 

embolism. As the clot in your lungs breaks up, it disperses into the periphery 

of your lungs, causing irritation to the lining of your lung and resulting in 

chest pain. You are being discharged home with outpatient follow-up with your 

primary care provider. 





Please follow-up with your pulmonologist, Dr Su, at your earliest 

convenience regarding the pulmonary nodules that were noted on your xray. 





Please take tylenol 650mg every four hours if you experience pain/aches until 

your symptoms resolve. 





If you experience any of the following symptoms, please return to the ED:


- Persistent, worsening chest pain 


- Pain in your neck, back, left arm or jaw


- Worsening shortness of breath, abdominal pain or swelling in your legs


- persistent palpitations or sensation of irregular heart beat


- Any new or concerning symptoms





- Post Discharge Activity

## 2018-05-23 ENCOUNTER — HOSPITAL ENCOUNTER (OUTPATIENT)
Dept: HOSPITAL 74 - JER | Age: 53
Setting detail: OBSERVATION
LOS: 2 days | Discharge: HOME | End: 2018-05-25
Attending: INTERNAL MEDICINE | Admitting: INTERNAL MEDICINE
Payer: COMMERCIAL

## 2018-05-23 VITALS — BODY MASS INDEX: 36.9 KG/M2

## 2018-05-23 DIAGNOSIS — M79.1: ICD-10-CM

## 2018-05-23 DIAGNOSIS — E78.5: ICD-10-CM

## 2018-05-23 DIAGNOSIS — F31.9: ICD-10-CM

## 2018-05-23 DIAGNOSIS — I10: ICD-10-CM

## 2018-05-23 DIAGNOSIS — Z95.5: ICD-10-CM

## 2018-05-23 DIAGNOSIS — R55: ICD-10-CM

## 2018-05-23 DIAGNOSIS — G89.29: ICD-10-CM

## 2018-05-23 DIAGNOSIS — M54.5: ICD-10-CM

## 2018-05-23 DIAGNOSIS — Z88.6: ICD-10-CM

## 2018-05-23 DIAGNOSIS — Z79.01: ICD-10-CM

## 2018-05-23 DIAGNOSIS — R07.9: Primary | ICD-10-CM

## 2018-05-23 DIAGNOSIS — I25.10: ICD-10-CM

## 2018-05-23 DIAGNOSIS — R91.1: ICD-10-CM

## 2018-05-23 DIAGNOSIS — I26.99: ICD-10-CM

## 2018-05-23 LAB
ALBUMIN SERPL-MCNC: 3.7 G/DL (ref 3.4–5)
ALP SERPL-CCNC: 115 U/L (ref 45–117)
ALT SERPL-CCNC: 37 U/L (ref 12–78)
ANION GAP SERPL CALC-SCNC: 9 MMOL/L (ref 8–16)
APTT BLD: 34.8 SECONDS (ref 26.9–34.4)
AST SERPL-CCNC: 23 U/L (ref 15–37)
BASOPHILS # BLD: 0.8 % (ref 0–2)
BILIRUB SERPL-MCNC: 0.4 MG/DL (ref 0.2–1)
BUN SERPL-MCNC: 17 MG/DL (ref 7–18)
CALCIUM SERPL-MCNC: 8.5 MG/DL (ref 8.5–10.1)
CHLORIDE SERPL-SCNC: 107 MMOL/L (ref 98–107)
CO2 SERPL-SCNC: 28 MMOL/L (ref 21–32)
CREAT SERPL-MCNC: 1 MG/DL (ref 0.7–1.3)
DEPRECATED RDW RBC AUTO: 15.5 % (ref 11.9–15.9)
EOSINOPHIL # BLD: 1.4 % (ref 0–4.5)
GLUCOSE SERPL-MCNC: 113 MG/DL (ref 74–106)
HCT VFR BLD CALC: 42.4 % (ref 35.4–49)
HGB BLD-MCNC: 14 GM/DL (ref 11.7–16.9)
INR BLD: 2.12 (ref 0.82–1.09)
LYMPHOCYTES # BLD: 24.9 % (ref 8–40)
MCH RBC QN AUTO: 29.2 PG (ref 25.7–33.7)
MCHC RBC AUTO-ENTMCNC: 33 G/DL (ref 32–35.9)
MCV RBC: 88.4 FL (ref 80–96)
MONOCYTES # BLD AUTO: 7.4 % (ref 3.8–10.2)
NEUTROPHILS # BLD: 65.5 % (ref 42.8–82.8)
PLATELET # BLD AUTO: 277 K/MM3 (ref 134–434)
PMV BLD: 8.9 FL (ref 7.5–11.1)
POTASSIUM SERPLBLD-SCNC: 3.9 MMOL/L (ref 3.5–5.1)
PROT SERPL-MCNC: 6.9 G/DL (ref 6.4–8.2)
PT PNL PPP: 23.9 SEC (ref 9.7–13)
RBC # BLD AUTO: 4.8 M/MM3 (ref 4–5.6)
SODIUM SERPL-SCNC: 144 MMOL/L (ref 136–145)
WBC # BLD AUTO: 11.3 K/MM3 (ref 4–10)

## 2018-05-23 PROCEDURE — G0378 HOSPITAL OBSERVATION PER HR: HCPCS

## 2018-05-23 PROCEDURE — 3E0337Z INTRODUCTION OF ELECTROLYTIC AND WATER BALANCE SUBSTANCE INTO PERIPHERAL VEIN, PERCUTANEOUS APPROACH: ICD-10-PCS | Performed by: INTERNAL MEDICINE

## 2018-05-23 RX ADMIN — GABAPENTIN SCH MG: 300 CAPSULE ORAL at 22:53

## 2018-05-23 RX ADMIN — ATORVASTATIN CALCIUM SCH MG: 80 TABLET, FILM COATED ORAL at 22:53

## 2018-05-23 RX ADMIN — QUETIAPINE FUMARATE SCH MG: 100 TABLET ORAL at 22:53

## 2018-05-23 NOTE — EKG
Test Reason : 

Blood Pressure : ***/*** mmHG

Vent. Rate : 069 BPM     Atrial Rate : 069 BPM

   P-R Int : 158 ms          QRS Dur : 084 ms

    QT Int : 394 ms       P-R-T Axes : 012 -11 023 degrees

   QTc Int : 422 ms

 

NORMAL SINUS RHYTHM

NORMAL ECG

WHEN COMPARED WITH ECG OF 23-MAY-2018 11:02,

NO SIGNIFICANT CHANGE WAS FOUND

Confirmed by CARL IRVING MD ([id number]) on 5/23/2018 4:31:17 PM

 

Referred By:             Confirmed By:CARL IRVING MD

## 2018-05-23 NOTE — HP
Admitting History and Physical





- Primary Care Physician


PCP: Manuela Jenkins





- Admission


Chief Complaint: chest pain.  syncope


History of Present Illness: 


53-year-old male, with a significant past medical history of CAD (s/p cath), HTN

, bipolar disorder, depression, chronic pain, multiple joint injuries and 

surgeries, and migraines, who presents to the ED with left-sided chest pain and 

shortness of breath today. The patient states that the pain radiates to his 

back. He was recently diagnosed with a PE and his currently taking xarelto. h/o 

pulmonary nodule


see dr de los santos for that














- Past Medical History


Cardiovascular: Yes: HTN


Pulmonary: Yes: Pulmonary Embolus


Musculoskeletal: Yes: Chronic low back pain





- Smoking History


Smoking history: Never smoked


Have you smoked in the past 12 months: No


Aproximately how many cigarettes per day: 0





- Alcohol/Substance Use


Hx Alcohol Use: No





Home Medications





- Allergies


Allergies/Adverse Reactions: 


 Allergies











Allergy/AdvReac Type Severity Reaction Status Date / Time


 


morphine Allergy  Rash Verified 05/23/18 10:57


 


meperidine HCl [From Demerol] AdvReac   Verified 05/23/18 10:57














- Home Medications


Home Medications: 


Ambulatory Orders





Diazepam [Valium -] 10 mg PO TID PRN 07/11/13 


Sumatriptan [Imitrex] 100 mg PO DAILY PRN 07/11/13 


Atorvastatin Ca [Lipitor] 80 mg PO DAILY 10/06/17 


Fluticasone Prop 0.05% Nasal [Flonase -] 3 spray NS DAILY 10/06/17 


Omeprazole 40 mg PO DAILY 10/06/17 


Quetiapine Fumarate [Seroquel] 100 mg PO TID 10/06/17 


Zolpidem Tartrate [Ambien] 10 mg PO HS PRN 10/06/17 


Amlodipine Besylate [Norvasc -] 5 mg PO DAILY #30 tablet 05/15/18 


Atenolol [Tenormin -] 25 mg PO BID #60 tablet 05/15/18 


Furosemide [Lasix -] 40 mg PO DAILY #30 tablet 05/15/18 


Gabapentin 600 mg PO TID #90 tablet 05/15/18 


Oxycodone HCl/Acetaminophen [Percocet  mg Tablet] 1 each PO TID #21 

tablet MDD 3 tabs 05/15/18 


Rivaroxaban [Xarelto -] 15 mg PO BID 21 Days #42 tab 05/15/18 











Review of Systems





- Review of Systems


Constitutional: reports: No Symptoms


Eyes: reports: No Symptoms


HENT: reports: No Symptoms


Neck: reports: No Symptoms


Cardiovascular: reports: Chest Pain


Respiratory: reports: No Symptoms


Gastrointestinal: reports: No Symptoms


Genitourinary: reports: No Symptoms


Musculoskeletal: reports: Back Pain


Neurological: reports: No Symptoms





Physical Examination


Vital Signs: 


 Vital Signs











Temperature  97.8 F   05/23/18 19:24


 


Pulse Rate  68   05/23/18 19:24


 


Respiratory Rate  18   05/23/18 19:24


 


Blood Pressure  121/81   05/23/18 19:24


 


O2 Sat by Pulse Oximetry (%)  96   05/23/18 19:24











Constitutional: Yes: No Distress


HENT: Yes: Atraumatic


Neck: Yes: Supple


Cardiovascular: Yes: Regular Rate and Rhythm


Respiratory: Yes: CTA Bilaterally


Gastrointestinal: Yes: Normal Bowel Sounds


Extremities: Yes: WNL


Neurological: Yes: Alert, Oriented


Labs: 


 CBC, BMP





 05/23/18 12:03 





 05/23/18 12:03 











Imaging





- Results


Cat Scan: Report Reviewed





Problem List





- Problems


(1) Near syncope


Assessment/Plan: 


will monitor on tele


Code(s): R55 - SYNCOPE AND COLLAPSE   





(2) Pulmonary nodule, left


Assessment/Plan: 


chest ct


pulmonary on board


Code(s): R91.1 - SOLITARY PULMONARY NODULE   





(3) Musculoskeletal pain


Code(s): M79.1 - MYALGIA   





(4) Chest pain


Assessment/Plan: 


fu cardiac enzymes


tele monitor


cardiology consult


Code(s): R07.9 - CHEST PAIN, UNSPECIFIED   





(5) Pulmonary embolism


Assessment/Plan: 


on xeralto


Code(s): I26.99 - OTHER PULMONARY EMBOLISM WITHOUT ACUTE COR PULMONALE   





(6) Hyperlipidemia


Code(s): E78.5 - HYPERLIPIDEMIA, UNSPECIFIED   





(7) Hypertension


Code(s): I10 - ESSENTIAL (PRIMARY) HYPERTENSION   





Assessment/Plan





 Laboratory Tests











  05/23/18 05/23/18 05/23/18





  12:03 12:03 12:08


 


WBC  11.3 H  


 


RBC  4.80  


 


Hgb  14.0  


 


Hct  42.4  


 


MCV  88.4  


 


MCH  29.2  


 


MCHC  33.0  


 


RDW  15.5  


 


Plt Count  277  


 


MPV  8.9  


 


Neutrophils %  65.5  


 


Lymphocytes %  24.9  


 


Monocytes %  7.4  


 


Eosinophils %  1.4  D  


 


Basophils %  0.8  


 


Nucleated RBC %  0  


 


PT with INR    23.90 H


 


INR    2.12 H


 


PTT (Actin FS)    34.8 H


 


Sodium   144 


 


Potassium   3.9 


 


Chloride   107 


 


Carbon Dioxide   28 


 


Anion Gap   9 


 


BUN   17  D 


 


Creatinine   1.0 


 


Creat Clearance w eGFR   > 60 


 


Random Glucose   113 H D 


 


Calcium   8.5 


 


Total Bilirubin   0.4 


 


AST   23  D 


 


ALT   37  D 


 


Alkaline Phosphatase   115 


 


Creatine Kinase   120 


 


Troponin I   < 0.02 


 


Total Protein   6.9 


 


Albumin   3.7 














  05/23/18





  16:24


 


WBC 


 


RBC 


 


Hgb 


 


Hct 


 


MCV 


 


MCH 


 


MCHC 


 


RDW 


 


Plt Count 


 


MPV 


 


Neutrophils % 


 


Lymphocytes % 


 


Monocytes % 


 


Eosinophils % 


 


Basophils % 


 


Nucleated RBC % 


 


PT with INR 


 


INR 


 


PTT (Actin FS) 


 


Sodium 


 


Potassium 


 


Chloride 


 


Carbon Dioxide 


 


Anion Gap 


 


BUN 


 


Creatinine 


 


Creat Clearance w eGFR 


 


Random Glucose 


 


Calcium 


 


Total Bilirubin 


 


AST 


 


ALT 


 


Alkaline Phosphatase 


 


Creatine Kinase  117


 


Troponin I  < 0.02


 


Total Protein 


 


Albumin 








Active Medications











Generic Name Dose Route Start Last Admin





  Trade Name Freq  PRN Reason Stop Dose Admin


 


Acetaminophen  325 mg  05/24/18 12:21  05/24/18 13:01





  Tylenol -  PO   325 mg





  Q6H PRN   Administration





  PAIN LEVEL (5-10)   





     





     





     


 


Amlodipine Besylate  5 mg  05/24/18 10:00  05/24/18 09:11





  Norvasc -  PO   5 mg





  DAILY TINA   Administration





     





     





     





     


 


Atenolol  25 mg  05/24/18 10:00  05/24/18 09:11





  Tenormin -  PO   25 mg





  BID TINA   Administration





     





     





     





     


 


Atorvastatin Calcium  80 mg  05/23/18 22:00  05/23/18 22:53





  Lipitor -  PO   80 mg





  HS TINA   Administration





     





     





     





     


 


Diazepam  10 mg  05/23/18 21:05  05/23/18 22:53





  Valium -  PO   10 mg





  Q8H PRN   Administration





  MUSCLE SPASMS   





     





     





     


 


Furosemide  40 mg  05/24/18 10:00  05/24/18 09:11





  Lasix -  PO   40 mg





  DAILY TINA   Administration





     





     





     





     


 


Gabapentin  600 mg  05/23/18 22:00  05/24/18 14:27





  Neurontin -  PO   600 mg





  TID TINA   Administration





     





     





     





     


 


Oxycodone HCl  10 mg  05/24/18 12:20  05/24/18 12:56





  Roxicodone -  PO   10 mg





  Q6H PRN   Administration





  PAIN LEVEL (5-10)   





     





     





     


 


Pantoprazole Sodium  40 mg  05/24/18 10:00  05/24/18 09:11





  Protonix -  PO   40 mg





  DAILY TINA   Administration





     





     





     





     


 


Quetiapine Fumarate  100 mg  05/23/18 22:00  05/24/18 14:27





  Seroquel -  PO   100 mg





  TID TINA   Administration





     





     





     





     


 


Rivaroxaban  15 mg  05/24/18 08:00  05/24/18 17:50





  Xarelto -  PO   Not Given





  BIDWM TINA
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
## 2018-05-23 NOTE — PDOC
History of Present Illness





- General


History Source: Patient


Exam Limitations: No Limitations





- History of Present Illness


Initial Comments: 


05/23/18 12:28


This is a 53-year-old male, with a significant past medical history of CAD (s/p 

cath), HTN, bipolar disorder, depression, chronic pain, multiple joint injuries 

and surgeries, and migraines, who presents to the ED with left-sided chest pain 

and shortness of breath today. The patient states that the pain radiates to his 

back. He was recently diagnosed with a PE and his currently taking xarelto. 





The patient denies any fever, chills, nausea, vomiting, diarrhea, or abdominal 

pain. 





Allergies: morphine, meperidine HCl





<Randi Amaro - Last Filed: 05/23/18 12:28>





<Sydni Simon - Last Filed: 05/24/18 09:12>





- General


Chief Complaint: Chest Pain


Stated Complaint: SOB, TIGHTNESS/ CHEST, BACK


Time Seen by Provider: 05/23/18 11:34





Past History





<Randi Amaro - Last Filed: 05/23/18 12:28>





- Past Medical History


Cardiac Disorders: Yes (RAPID HEARTBEAT)


COPD: No


GI Disorders: Yes (GERD)


HTN: Yes


Hypercholesterolemia: Yes


Seizures: Yes (BIPOLAR)





- Surgical History


Abdominal Surgery: Yes (bilat hernia)


Appendectomy: Yes





- Suicide/Smoking/Psychosocial Hx


Smoking Status: No


Smoking History: Never smoked


Have you smoked in the past 12 months: No


Number of Cigarettes Smoked Daily: 0


Information on smoking cessation initiated: No


Hx Alcohol Use: No


Drug/Substance Use Hx: No


Substance Use Type: None





<Sydni Simon - Last Filed: 05/24/18 09:12>





- Past Medical History


Allergies/Adverse Reactions: 


 Allergies











Allergy/AdvReac Type Severity Reaction Status Date / Time


 


morphine Allergy  Rash Verified 05/23/18 10:57


 


meperidine HCl [From Demerol] AdvReac   Verified 05/23/18 10:57











Home Medications: 


Ambulatory Orders





Diazepam [Valium -] 10 mg PO TID PRN 07/11/13 


Sumatriptan [Imitrex] 100 mg PO DAILY PRN 07/11/13 


Atorvastatin Ca [Lipitor] 80 mg PO DAILY 10/06/17 


Fluticasone Prop 0.05% Nasal [Flonase -] 3 spray NS DAILY 10/06/17 


Omeprazole 40 mg PO DAILY 10/06/17 


Quetiapine Fumarate [Seroquel] 100 mg PO TID 10/06/17 


Zolpidem Tartrate [Ambien] 10 mg PO HS PRN 10/06/17 


Amlodipine Besylate [Norvasc -] 5 mg PO DAILY #30 tablet 05/15/18 


Atenolol [Tenormin -] 25 mg PO BID #60 tablet 05/15/18 


Furosemide [Lasix -] 40 mg PO DAILY #30 tablet 05/15/18 


Gabapentin 600 mg PO TID #90 tablet 05/15/18 


Oxycodone HCl/Acetaminophen [Percocet  mg Tablet] 1 each PO TID #21 

tablet MDD 3 tabs 05/15/18 


Rivaroxaban [Xarelto -] 15 mg PO BID 21 Days #42 tab 05/15/18 











**Review of Systems





- Review of Systems


Able to Perform ROS?: Yes


Comments:: 


05/23/18 12:29


GENERAL/CONSTITUTIONAL: No fever or chills. No weakness.


HEAD, EYES, EARS, NOSE AND THROAT: No change in vision. No ear pain or 

discharge. No sore throat.


CARDIOVASCULAR: (+)chest pain, shortness of breath.


RESPIRATORY: No cough, wheezing, or hemoptysis.


GASTROINTESTINAL: No nausea, vomiting, diarrhea or constipation.


GENITOURINARY: No dysuria, frequency, or change in urination.


MUSCULOSKELETAL: No joint or muscle swelling or pain. No neck or back pain.


SKIN: No rash


NEUROLOGIC: No headache, vertigo, loss of consciousness, or change in strength/

sensation.


ENDOCRINE: No increased thirst. No abnormal weight change.


HEMATOLOGIC/LYMPHATIC: No anemia, easy bleeding, or history of blood clots.


ALLERGIC/IMMUNOLOGIC: No hives or skin allergy.


   











<Randi Amaro - Last Filed: 05/23/18 12:28>





*Physical Exam





- Vital Signs


 Last Vital Signs











Temp Pulse Resp BP Pulse Ox


 


 98.4 F   73   20   132/84   99 


 


 05/23/18 10:53  05/23/18 10:53  05/23/18 10:53  05/23/18 10:53  05/23/18 10:53














- Physical Exam


Comments: 


05/23/18 12:30


GENERAL: Awake, alert, and fully oriented, in no acute distress


HEAD: No signs of trauma


EYES: PERRLA, EOMI, sclera anicteric, conjunctiva clear


ENT: Auricles normal inspection, hearing grossly normal, nares patent, 

oropharynx clear without 


exudates. Moist mucosa


NECK: Normal ROM, supple, no lymphadenopathy, JVD, or masses


LUNGS: Breath sounds equal, clear to auscultation bilaterally.  No wheezes, and 

no crackles


HEART: Regular rate and rhythm, normal S1 and S2, no murmurs, rubs or gallops


ABDOMEN: Soft, nontender, normoactive bowel sounds.  No guarding, no rebound.  

No masses


EXTREMITIES: Normal range of motion, no edema.  No clubbing or cyanosis. No 

cords, erythema, or tenderness


NEUROLOGICAL: Cranial nerves II through XII grossly intact.  Normal speech, 

normal gait


SKIN: Warm, Dry, normal turgor, no rashes or lesions noted











<Randi Amaro - Last Filed: 05/23/18 12:28>





- Vital Signs


 Last Vital Signs











Temp Pulse Resp BP Pulse Ox


 


 98.4 F   73   20   132/84   99 


 


 05/23/18 10:53  05/23/18 10:53  05/23/18 10:53  05/23/18 10:53  05/23/18 10:53














<Sydni Simon - Last Filed: 05/24/18 09:12>





**Heart Score/ECG Review





- ECG Impressions


Comment:: 





EKG read 12:23- NSR 69 bpm, no acute ST/T changes








<Sydni Simon - Last Filed: 05/24/18 09:12>





ED Treatment Course





- LABORATORY


CBC & Chemistry Diagram: 


 05/23/18 12:03





 05/23/18 12:03





<Randi Amaro - Last Filed: 05/23/18 12:28>





- LABORATORY


CBC & Chemistry Diagram: 


 05/24/18 07:08





 05/24/18 07:08





<Sydni Simon - Last Filed: 05/24/18 09:12>





Medical Decision Making





- Medical Decision Making





05/23/18 17:04


Pt endorsed to Dr. Welsh at 5pm shift change. First troponin was negative. 

CTA shows resolving PE and resolution of lower lobe infiltrate. Patient states 

he still feels lightheaded when he stands. He appears clinically dehydrated, 

and states that he has been taking lasix recently. Patient receiving a second 

liter of fluid and awaiting repeat troponin, then reassessment. 








<Sydni Simon - Last Filed: 05/24/18 09:12>





*DC/Admit/Observation/Transfer





- Attestations


Scribe Attestion: 





05/23/18 12:30





Documentation prepared by Randi Amaro, acting as medical scribe for Sydni Simon MD.





<Randi Amaro - Last Filed: 05/23/18 12:28>





<Sydni Simon - Last Filed: 05/24/18 09:12>


Diagnosis at time of Disposition: 


 Near syncope, Pulmonary nodule, left, Musculoskeletal pain








- Discharge Dispostion


Condition at time of disposition: Stable

## 2018-05-24 LAB
ALBUMIN SERPL-MCNC: 3.4 G/DL (ref 3.4–5)
ALP SERPL-CCNC: 124 U/L (ref 45–117)
ALT SERPL-CCNC: 35 U/L (ref 12–78)
ANION GAP SERPL CALC-SCNC: 9 MMOL/L (ref 8–16)
AST SERPL-CCNC: 19 U/L (ref 15–37)
BASOPHILS # BLD: 0.6 % (ref 0–2)
BILIRUB SERPL-MCNC: 0.3 MG/DL (ref 0.2–1)
BUN SERPL-MCNC: 17 MG/DL (ref 7–18)
CALCIUM SERPL-MCNC: 8.4 MG/DL (ref 8.5–10.1)
CHLORIDE SERPL-SCNC: 106 MMOL/L (ref 98–107)
CO2 SERPL-SCNC: 27 MMOL/L (ref 21–32)
CREAT SERPL-MCNC: 0.9 MG/DL (ref 0.7–1.3)
DEPRECATED RDW RBC AUTO: 15.8 % (ref 11.9–15.9)
EOSINOPHIL # BLD: 2.2 % (ref 0–4.5)
GLUCOSE SERPL-MCNC: 102 MG/DL (ref 74–106)
HCT VFR BLD CALC: 41.7 % (ref 35.4–49)
HGB BLD-MCNC: 13.8 GM/DL (ref 11.7–16.9)
LYMPHOCYTES # BLD: 34.1 % (ref 8–40)
MCH RBC QN AUTO: 29.3 PG (ref 25.7–33.7)
MCHC RBC AUTO-ENTMCNC: 33.1 G/DL (ref 32–35.9)
MCV RBC: 88.5 FL (ref 80–96)
MONOCYTES # BLD AUTO: 8 % (ref 3.8–10.2)
NEUTROPHILS # BLD: 55.1 % (ref 42.8–82.8)
PLATELET # BLD AUTO: 257 K/MM3 (ref 134–434)
PMV BLD: 8.7 FL (ref 7.5–11.1)
POTASSIUM SERPLBLD-SCNC: 3.8 MMOL/L (ref 3.5–5.1)
PROT SERPL-MCNC: 6.3 G/DL (ref 6.4–8.2)
RBC # BLD AUTO: 4.71 M/MM3 (ref 4–5.6)
SODIUM SERPL-SCNC: 142 MMOL/L (ref 136–145)
WBC # BLD AUTO: 9.5 K/MM3 (ref 4–10)

## 2018-05-24 RX ADMIN — ATENOLOL SCH MG: 25 TABLET ORAL at 09:11

## 2018-05-24 RX ADMIN — ATORVASTATIN CALCIUM SCH MG: 80 TABLET, FILM COATED ORAL at 21:12

## 2018-05-24 RX ADMIN — AMLODIPINE BESYLATE SCH MG: 5 TABLET ORAL at 09:11

## 2018-05-24 RX ADMIN — ACETAMINOPHEN PRN MG: 325 TABLET ORAL at 21:16

## 2018-05-24 RX ADMIN — GABAPENTIN SCH MG: 300 CAPSULE ORAL at 14:27

## 2018-05-24 RX ADMIN — RIVAROXABAN SCH MG: 15 TABLET, FILM COATED ORAL at 21:14

## 2018-05-24 RX ADMIN — GABAPENTIN SCH MG: 300 CAPSULE ORAL at 06:49

## 2018-05-24 RX ADMIN — FUROSEMIDE SCH MG: 40 TABLET ORAL at 09:11

## 2018-05-24 RX ADMIN — GABAPENTIN SCH MG: 300 CAPSULE ORAL at 21:12

## 2018-05-24 RX ADMIN — RIVAROXABAN SCH: 15 TABLET, FILM COATED ORAL at 17:50

## 2018-05-24 RX ADMIN — QUETIAPINE FUMARATE SCH MG: 100 TABLET ORAL at 21:13

## 2018-05-24 RX ADMIN — PANTOPRAZOLE SODIUM SCH MG: 40 TABLET, DELAYED RELEASE ORAL at 09:11

## 2018-05-24 RX ADMIN — ATENOLOL SCH MG: 25 TABLET ORAL at 21:13

## 2018-05-24 RX ADMIN — QUETIAPINE FUMARATE SCH MG: 100 TABLET ORAL at 14:27

## 2018-05-24 RX ADMIN — ACETAMINOPHEN PRN MG: 325 TABLET ORAL at 13:01

## 2018-05-24 RX ADMIN — QUETIAPINE FUMARATE SCH MG: 100 TABLET ORAL at 06:49

## 2018-05-24 RX ADMIN — RIVAROXABAN SCH MG: 15 TABLET, FILM COATED ORAL at 09:10
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
## 2018-05-24 NOTE — DS
Physical Examination


Vital Signs: 


 Vital Signs











Temperature  98.1 F   05/24/18 08:45


 


Pulse Rate  66   05/24/18 08:45


 


Respiratory Rate  18   05/24/18 08:45


 


Blood Pressure  133/87   05/24/18 08:45


 


O2 Sat by Pulse Oximetry (%)  97   05/24/18 09:10











Constitutional: Yes: No Distress


HENT: Yes: Atraumatic


Neck: Yes: Supple


Cardiovascular: Yes: Regular Rate and Rhythm


Respiratory: Yes: CTA Bilaterally


Gastrointestinal: Yes: Normal Bowel Sounds


Extremities: Yes: WNL


Edema: No


Peripheral Pulses WNL: Yes


Neurological: Yes: Alert, Oriented


Labs: 


 CBC, BMP





 05/24/18 07:08 





 05/24/18 07:08 











Discharge Summary


Reason For Visit: CHEST PAIN


Current Active Problems





Near syncope (Acute)


Pulmonary nodule, left (Acute)


Musculoskeletal pain (Chronic)








Condition: Stable





- Instructions


Referrals: 


ON STAFF,NOT [Primary Care Provider] - 





- Home Medications


Comprehensive Discharge Medication List: 


Ambulatory Orders





Diazepam [Valium -] 10 mg PO TID PRN 07/11/13 


Sumatriptan [Imitrex] 100 mg PO DAILY PRN 07/11/13 


Atorvastatin Ca [Lipitor] 80 mg PO DAILY 10/06/17 


Fluticasone Prop 0.05% Nasal [Flonase -] 3 spray NS DAILY 10/06/17 


Omeprazole 40 mg PO DAILY 10/06/17 


Quetiapine Fumarate [Seroquel] 100 mg PO TID 10/06/17 


Zolpidem Tartrate [Ambien] 10 mg PO HS PRN 10/06/17 


Amlodipine Besylate [Norvasc -] 5 mg PO DAILY #30 tablet 05/15/18 


Atenolol [Tenormin -] 25 mg PO BID #60 tablet 05/15/18 


Furosemide [Lasix -] 40 mg PO DAILY #30 tablet 05/15/18 


Gabapentin 600 mg PO TID #90 tablet 05/15/18 


Oxycodone HCl/Acetaminophen [Percocet  mg Tablet] 1 each PO TID #21 

tablet MDD 3 tabs 05/15/18 


Rivaroxaban [Xarelto -] 15 mg PO BID 21 Days #42 tab 05/15/18 





dc home if cleared by cardiology

## 2018-05-24 NOTE — PN
Progress Note, Physician





- Current Medication List


Current Medications: 


Active Medications





Acetaminophen (Tylenol -)  325 mg PO Q6H PRN


   PRN Reason: PAIN LEVEL (5-10)


   Last Admin: 05/24/18 13:01 Dose:  325 mg


Amlodipine Besylate (Norvasc -)  5 mg PO DAILY [facility]


   Last Admin: 05/24/18 09:11 Dose:  5 mg


Atenolol (Tenormin -)  25 mg PO BID [facility]


   Last Admin: 05/24/18 09:11 Dose:  25 mg


Atorvastatin Calcium (Lipitor -)  80 mg PO HS [facility]


   Last Admin: 05/23/18 22:53 Dose:  80 mg


Diazepam (Valium -)  10 mg PO Q8H PRN


   PRN Reason: MUSCLE SPASMS


   Last Admin: 05/23/18 22:53 Dose:  10 mg


Furosemide (Lasix -)  40 mg PO DAILY [facility]


   Last Admin: 05/24/18 09:11 Dose:  40 mg


Gabapentin (Neurontin -)  600 mg PO TID [facility]


   Last Admin: 05/24/18 14:27 Dose:  600 mg


Oxycodone HCl (Roxicodone -)  10 mg PO Q6H PRN


   PRN Reason: PAIN LEVEL (5-10)


   Last Admin: 05/24/18 12:56 Dose:  10 mg


Pantoprazole Sodium (Protonix -)  40 mg PO DAILY [facility]


   Last Admin: 05/24/18 09:11 Dose:  40 mg


Quetiapine Fumarate (Seroquel -)  100 mg PO TID [facility]


   Last Admin: 05/24/18 14:27 Dose:  100 mg


Rivaroxaban (Xarelto -)  15 mg PO BIDWM [facility]


   Last Admin: 05/24/18 09:10 Dose:  Not Given











- Objective


Vital Signs: 


 Vital Signs











Temperature  98.1 F   05/24/18 08:45


 


Pulse Rate  66   05/24/18 08:45


 


Respiratory Rate  18   05/24/18 08:45


 


Blood Pressure  133/87   05/24/18 08:45


 


O2 Sat by Pulse Oximetry (%)  97   05/24/18 09:10











Labs: 


 CBC, BMP





 05/24/18 07:08 





 05/24/18 07:08 





 INR, PTT











INR  2.12  (0.82-1.09)  H  05/23/18  12:08    














Problem List





- Problems


(1) Near syncope


Code(s): R55 - SYNCOPE AND COLLAPSE   





(2) Pulmonary nodule, left


Code(s): R91.1 - SOLITARY PULMONARY NODULE   





(3) Musculoskeletal pain


Code(s): M79.1 - MYALGIA   





(4) Chest pain


Code(s): R07.9 - CHEST PAIN, UNSPECIFIED   





(5) Pulmonary embolism


Code(s): I26.99 - OTHER PULMONARY EMBOLISM WITHOUT ACUTE COR PULMONALE   





(6) Hyperlipidemia


Code(s): E78.5 - HYPERLIPIDEMIA, UNSPECIFIED   





(7) Hypertension


Code(s): I10 - ESSENTIAL (PRIMARY) HYPERTENSION
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
## 2018-05-25 VITALS — HEART RATE: 84 BPM | SYSTOLIC BLOOD PRESSURE: 122 MMHG | DIASTOLIC BLOOD PRESSURE: 66 MMHG | TEMPERATURE: 98.1 F

## 2018-05-25 RX ADMIN — FUROSEMIDE SCH MG: 40 TABLET ORAL at 10:43

## 2018-05-25 RX ADMIN — AMLODIPINE BESYLATE SCH MG: 5 TABLET ORAL at 10:43

## 2018-05-25 RX ADMIN — QUETIAPINE FUMARATE SCH MG: 100 TABLET ORAL at 06:22

## 2018-05-25 RX ADMIN — RIVAROXABAN SCH MG: 15 TABLET, FILM COATED ORAL at 10:43

## 2018-05-25 RX ADMIN — PANTOPRAZOLE SODIUM SCH MG: 40 TABLET, DELAYED RELEASE ORAL at 12:31

## 2018-05-25 RX ADMIN — ACETAMINOPHEN PRN MG: 325 TABLET ORAL at 06:44

## 2018-05-25 RX ADMIN — ATENOLOL SCH MG: 25 TABLET ORAL at 10:43

## 2018-05-25 RX ADMIN — GABAPENTIN SCH MG: 300 CAPSULE ORAL at 06:21

## 2018-05-25 NOTE — DS
Physical Examination


Vital Signs: 


 Vital Signs











Temperature  98.1 F   05/25/18 10:00


 


Pulse Rate  84   05/25/18 10:00


 


Respiratory Rate  22   05/25/18 10:00


 


Blood Pressure  122/66   05/25/18 10:00


 


O2 Sat by Pulse Oximetry (%)  96   05/25/18 10:00











Constitutional: Yes: No Distress


HENT: Yes: Atraumatic


Neck: Yes: Supple


Cardiovascular: Yes: Regular Rate and Rhythm


Respiratory: Yes: CTA Bilaterally


Gastrointestinal: Yes: Normal Bowel Sounds


Extremities: Yes: WNL


Edema: No


Neurological: Yes: Alert, Oriented


Labs: 


 CBC, BMP





 05/24/18 07:08 





 05/24/18 07:08 











Discharge Summary


Reason For Visit: CHEST PAIN


Current Active Problems





Near syncope (Acute)


Pulmonary nodule, left (Acute)


Musculoskeletal pain (Chronic)








Condition: Stable





- Instructions


Referrals: 


ON STAFF,NOT [Primary Care Provider] - 





- Home Medications


Comprehensive Discharge Medication List: 


Ambulatory Orders





Diazepam [Valium -] 10 mg PO TID PRN 07/11/13 


Sumatriptan [Imitrex] 100 mg PO DAILY PRN 07/11/13 


Atorvastatin Ca [Lipitor] 80 mg PO DAILY 10/06/17 


Fluticasone Prop 0.05% Nasal [Flonase -] 3 spray NS DAILY 10/06/17 


Omeprazole 40 mg PO DAILY 10/06/17 


Quetiapine Fumarate [Seroquel] 100 mg PO TID 10/06/17 


Zolpidem Tartrate [Ambien] 10 mg PO HS PRN 10/06/17 


Amlodipine Besylate [Norvasc -] 5 mg PO DAILY #30 tablet 05/15/18 


Atenolol [Tenormin -] 25 mg PO BID #60 tablet 05/15/18 


Furosemide [Lasix -] 40 mg PO DAILY #30 tablet 05/15/18 


Gabapentin 600 mg PO TID #90 tablet 05/15/18 


Oxycodone HCl/Acetaminophen [Percocet  mg Tablet] 1 each PO TID #21 

tablet MDD 3 tabs 05/15/18 


Rivaroxaban [Xarelto -] 15 mg PO BID 21 Days #42 tab 05/15/18 





[place] home

## 2018-05-25 NOTE — PN
Progress Note (short form)





- Note


Progress Note: 


Chief Complaint: Events noted, notes reviewed, denies any chest discomfort, 

reports dyspnea with moderate physical exertion





History of Present Illness: 


Seen and examined on telemetry. Events noted, notes reviewed, denies any chest 

discomfort, reports dyspnea with moderate physical exertion





Medications: 


 Current Medications





Acetaminophen (Tylenol -)  325 mg PO Q6H PRN


   PRN Reason: PAIN LEVEL (5-10)


   Last Admin: 05/25/18 06:44 Dose:  325 mg


Amlodipine Besylate (Norvasc -)  5 mg PO DAILY [facility]


   Last Admin: 05/24/18 09:11 Dose:  5 mg


Atenolol (Tenormin -)  25 mg PO BID [facility]


   Last Admin: 05/24/18 21:13 Dose:  25 mg


Atorvastatin Calcium (Lipitor -)  80 mg PO HS [facility]


   Last Admin: 05/24/18 21:12 Dose:  80 mg


Diazepam (Valium -)  10 mg PO Q8H PRN


   PRN Reason: MUSCLE SPASMS


   Last Admin: 05/24/18 21:15 Dose:  10 mg


Furosemide (Lasix -)  40 mg PO DAILY [facility]


   Last Admin: 05/24/18 09:11 Dose:  40 mg


Gabapentin (Neurontin -)  600 mg PO TID [facility]


   Last Admin: 05/25/18 06:21 Dose:  600 mg


Oxycodone HCl (Roxicodone -)  10 mg PO Q6H PRN


   PRN Reason: PAIN LEVEL (5-10)


   Last Admin: 05/25/18 06:43 Dose:  10 mg


Pantoprazole Sodium (Protonix -)  40 mg PO DAILY [facility]


   Last Admin: 05/24/18 09:11 Dose:  40 mg


Quetiapine Fumarate (Seroquel -)  100 mg PO TID [facility]


   Last Admin: 05/25/18 06:22 Dose:  100 mg


Rivaroxaban (Xarelto -)  15 mg PO BIDWM [facility]


   Last Admin: 05/24/18 21:14 Dose:  15 mg








Review of Systems





- Review of Systems


Constitutional: denies: Chills or Fever


Cardiovascular: As noted above


Respiratory: denies: Cough or Sputum Production


Gastrointestinal: denies: Abdominal Pain, Constipation, Diarrhea, Nausea or 

Vomiting


Musculoskeletal: reports: Joint Pain


Neurological: denies: Dizziness or Headache


Psychiatric: reports: Depression





Vital Signs: 


  Last Vital Signs











Temp Pulse Resp BP Pulse Ox


 


 98.3 F   62   20   103/62   98 


 


 05/25/18 06:00  05/25/18 06:00  05/25/18 06:00  05/25/18 06:00  05/25/18 01:15








 Intake & Output











 05/22/18 05/23/18 05/24/18 05/25/18





 23:59 23:59 23:59 23:59


 


Intake Total  500 500 


 


Balance  500 500 


 


Weight  265 lb  











Eyes:   GABRIELA


HEENT:   Atraumatic


Neck:   Supple Negative JVD


Cardiovascular: S1 and S2 Regular Rate and Rhythm


Respiratory:   CTA Bilaterally


Gastrointestinal: Soft Benign Normal Bowel Sounds


Ext: No Edema





Labs: 


  CBC, BMP





 05/24/18 07:08 





 05/24/18 07:08 





 Hepatic Panel











Total Bilirubin  0.3 mg/dL (0.2-1.0)  D 05/24/18  07:08    


 


AST  19 U/L (15-37)   05/24/18  07:08    


 


ALT  35 U/L (12-78)   05/24/18  07:08    


 


Alkaline Phosphatase  124 U/L ()  H  05/24/18  07:08    


 


Albumin  3.4 g/dl (3.4-5.0)   05/24/18  07:08    








 INR, PTT











INR  2.12  (0.82-1.09)  H  05/23/18  12:08    














Assessment/Plan





ASSESSMENT:





1. Pulmonary embolism on DOAC's/Xarelto, appropriate dosage


2. CAD angina pectoris, stable


3. Hypertension


4. Hypercholesterolemia


5. Bipolar disorder/depression


6. Degenerative joint disease 





PLAN:





1. Continue DOAC's/Xarelto with close monitoring of CBC


2. Continue Atenolol


3. Continue Amlodipine


4. Continue Atorvastatin 


5. Continue Furosemide with close monitoring of renal function





Patient history followup with his cardiologist Dr. JOSE ALFERDO Duffy upon discharge














Juli Hughes MD

## 2018-09-24 ENCOUNTER — HOSPITAL ENCOUNTER (EMERGENCY)
Dept: HOSPITAL 74 - JER | Age: 53
Discharge: LEFT BEFORE DISCHARGE | End: 2018-09-24
Payer: COMMERCIAL

## 2018-09-24 VITALS — HEART RATE: 81 BPM | SYSTOLIC BLOOD PRESSURE: 142 MMHG | DIASTOLIC BLOOD PRESSURE: 82 MMHG | TEMPERATURE: 98.9 F

## 2018-09-24 VITALS — BODY MASS INDEX: 2.7 KG/M2

## 2018-09-24 DIAGNOSIS — Z53.21: Primary | ICD-10-CM

## 2018-09-30 ENCOUNTER — HOSPITAL ENCOUNTER (EMERGENCY)
Dept: HOSPITAL 74 - JER | Age: 53
LOS: 1 days | Discharge: HOME | End: 2018-10-01
Payer: COMMERCIAL

## 2018-09-30 VITALS — HEART RATE: 77 BPM | SYSTOLIC BLOOD PRESSURE: 117 MMHG | TEMPERATURE: 98.3 F | DIASTOLIC BLOOD PRESSURE: 83 MMHG

## 2018-09-30 VITALS — BODY MASS INDEX: 37.4 KG/M2

## 2018-09-30 DIAGNOSIS — Z79.01: ICD-10-CM

## 2018-09-30 DIAGNOSIS — Z86.718: ICD-10-CM

## 2018-09-30 DIAGNOSIS — I10: ICD-10-CM

## 2018-09-30 DIAGNOSIS — I25.10: ICD-10-CM

## 2018-09-30 DIAGNOSIS — F31.9: ICD-10-CM

## 2018-09-30 DIAGNOSIS — G89.29: ICD-10-CM

## 2018-09-30 DIAGNOSIS — Z86.711: ICD-10-CM

## 2018-09-30 DIAGNOSIS — R07.9: Primary | ICD-10-CM

## 2018-09-30 DIAGNOSIS — E78.5: ICD-10-CM

## 2018-09-30 DIAGNOSIS — Z88.8: ICD-10-CM

## 2018-09-30 DIAGNOSIS — F32.9: ICD-10-CM

## 2018-09-30 LAB
ALBUMIN SERPL-MCNC: 3.9 G/DL (ref 3.4–5)
ALP SERPL-CCNC: 160 U/L (ref 45–117)
ALT SERPL-CCNC: 48 U/L (ref 13–61)
ANION GAP SERPL CALC-SCNC: 10 MMOL/L (ref 8–16)
AST SERPL-CCNC: 41 U/L (ref 15–37)
BASOPHILS # BLD: 1.3 % (ref 0–2)
BILIRUB SERPL-MCNC: 0.3 MG/DL (ref 0.2–1)
BNP SERPL-MCNC: 219 PG/ML (ref 5–125)
BUN SERPL-MCNC: 23 MG/DL (ref 7–18)
CALCIUM SERPL-MCNC: 8.4 MG/DL (ref 8.5–10.1)
CHLORIDE SERPL-SCNC: 102 MMOL/L (ref 98–107)
CO2 SERPL-SCNC: 29 MMOL/L (ref 21–32)
CREAT SERPL-MCNC: 1.2 MG/DL (ref 0.55–1.3)
DEPRECATED RDW RBC AUTO: 14.7 % (ref 11.9–15.9)
EOSINOPHIL # BLD: 2.9 % (ref 0–4.5)
GLUCOSE SERPL-MCNC: 120 MG/DL (ref 74–106)
HCT VFR BLD CALC: 42.9 % (ref 35.4–49)
HGB BLD-MCNC: 14.3 GM/DL (ref 11.7–16.9)
INR BLD: 1.07 (ref 0.83–1.09)
LYMPHOCYTES # BLD: 33.6 % (ref 8–40)
MCH RBC QN AUTO: 30.3 PG (ref 25.7–33.7)
MCHC RBC AUTO-ENTMCNC: 33.4 G/DL (ref 32–35.9)
MCV RBC: 90.7 FL (ref 80–96)
MONOCYTES # BLD AUTO: 8 % (ref 3.8–10.2)
NEUTROPHILS # BLD: 54.2 % (ref 42.8–82.8)
PLATELET # BLD AUTO: 190 K/MM3 (ref 134–434)
PMV BLD: 8.7 FL (ref 7.5–11.1)
POTASSIUM SERPLBLD-SCNC: 3.7 MMOL/L (ref 3.5–5.1)
PROT SERPL-MCNC: 7.2 G/DL (ref 6.4–8.2)
PT PNL PPP: 12.6 SEC (ref 9.7–13)
RBC # BLD AUTO: 4.73 M/MM3 (ref 4–5.6)
SODIUM SERPL-SCNC: 140 MMOL/L (ref 136–145)
WBC # BLD AUTO: 9.5 K/MM3 (ref 4–10)

## 2018-10-02 ENCOUNTER — HOSPITAL ENCOUNTER (EMERGENCY)
Dept: HOSPITAL 74 - JER | Age: 53
Discharge: HOME | End: 2018-10-02
Payer: COMMERCIAL

## 2018-10-02 VITALS — SYSTOLIC BLOOD PRESSURE: 110 MMHG | HEART RATE: 71 BPM | DIASTOLIC BLOOD PRESSURE: 80 MMHG

## 2018-10-02 VITALS — BODY MASS INDEX: 40.6 KG/M2

## 2018-10-02 VITALS — TEMPERATURE: 99.2 F

## 2018-10-02 DIAGNOSIS — I25.10: ICD-10-CM

## 2018-10-02 DIAGNOSIS — I10: ICD-10-CM

## 2018-10-02 DIAGNOSIS — Z79.01: ICD-10-CM

## 2018-10-02 DIAGNOSIS — Z86.711: ICD-10-CM

## 2018-10-02 DIAGNOSIS — Z86.718: ICD-10-CM

## 2018-10-02 DIAGNOSIS — R06.02: Primary | ICD-10-CM

## 2018-10-02 DIAGNOSIS — E78.5: ICD-10-CM

## 2018-10-02 DIAGNOSIS — F31.9: ICD-10-CM

## 2018-10-02 DIAGNOSIS — Z88.8: ICD-10-CM

## 2018-10-02 LAB
ALBUMIN SERPL-MCNC: 3.7 G/DL (ref 3.4–5)
ALP SERPL-CCNC: 149 U/L (ref 45–117)
ALT SERPL-CCNC: 44 U/L (ref 13–61)
ANION GAP SERPL CALC-SCNC: 7 MMOL/L (ref 8–16)
AST SERPL-CCNC: 36 U/L (ref 15–37)
BASOPHILS # BLD: 1.3 % (ref 0–2)
BILIRUB SERPL-MCNC: 0.2 MG/DL (ref 0.2–1)
BUN SERPL-MCNC: 20 MG/DL (ref 7–18)
CALCIUM SERPL-MCNC: 8.3 MG/DL (ref 8.5–10.1)
CHLORIDE SERPL-SCNC: 103 MMOL/L (ref 98–107)
CO2 SERPL-SCNC: 29 MMOL/L (ref 21–32)
CREAT SERPL-MCNC: 1 MG/DL (ref 0.55–1.3)
DEPRECATED RDW RBC AUTO: 15 % (ref 11.9–15.9)
EOSINOPHIL # BLD: 4.1 % (ref 0–4.5)
GLUCOSE SERPL-MCNC: 117 MG/DL (ref 74–106)
HCT VFR BLD CALC: 42.3 % (ref 35.4–49)
HGB BLD-MCNC: 13.8 GM/DL (ref 11.7–16.9)
LYMPHOCYTES # BLD: 38 % (ref 8–40)
MCH RBC QN AUTO: 29.7 PG (ref 25.7–33.7)
MCHC RBC AUTO-ENTMCNC: 32.7 G/DL (ref 32–35.9)
MCV RBC: 90.9 FL (ref 80–96)
MONOCYTES # BLD AUTO: 9.3 % (ref 3.8–10.2)
NEUTROPHILS # BLD: 47.3 % (ref 42.8–82.8)
PLATELET # BLD AUTO: 195 K/MM3 (ref 134–434)
PMV BLD: 8.6 FL (ref 7.5–11.1)
POTASSIUM SERPLBLD-SCNC: 3.9 MMOL/L (ref 3.5–5.1)
PROT SERPL-MCNC: 6.8 G/DL (ref 6.4–8.2)
RBC # BLD AUTO: 4.66 M/MM3 (ref 4–5.6)
SODIUM SERPL-SCNC: 139 MMOL/L (ref 136–145)
WBC # BLD AUTO: 7.7 K/MM3 (ref 4–10)

## 2018-10-02 NOTE — PDOC
History of Present Illness





- General


Chief Complaint: Shortness of Breath


Stated Complaint: DIFFICULTY BREATHING


Time Seen by Provider: 10/02/18 05:28


History Source: Patient


Exam Limitations: No Limitations





- History of Present Illness


Initial Comments: 





10/02/18 06:13


53 year old male with PMH PE (05/2018), DVT, HTN, HLD, CAD, bipolar disorder, 

chronic pain BIBA for SOB x4 days. He states that he has been feeling SOB when 

he walks up 10 steps, usually he would be able to walk further. He admits to 

left sided chest pain, described as chest tightness and heavy, 7/10, constant 

but waxing and waning, nonradiating, aggravated by exertion, relieved by rest 

and muscle relaxer. Pt admits to LUQ pain, bilateral lower extremity swelling. 

Denies fever, chills, nausea, vomiting, diarrhea, headache, weakness. He states 

he doubled his dose of Lasix yesterday to try to reduce his feet swelling, 

total of 80 mg, without relief of his symptoms. He states that last night he 

took an ambien and diazepam to go to sleep, had a dream that he was drowning, 

woke up and had a "panic attack". Pt states he is concerned that he has another 

PE. 





Pt was seen in ED for similar symptoms 09/30 - B/L duplex of lower extremity 

was negative for DVT. 





PCP - Dr. Nowiakinsky


Pulm - Brill


Allergies - ketorolac, tramadol, trazodone, morphine, meperidine





Past History





- Past Medical History


Allergies/Adverse Reactions: 


 Allergies











Allergy/AdvReac Type Severity Reaction Status Date / Time


 


ketorolac [From Toradol] Allergy Severe Difficulty Verified 10/02/18 06:22





   Breathing  


 


tramadol Allergy Severe Difficulty Verified 10/02/18 06:22





   Breathing  


 


trazodone Allergy Severe Difficulty Verified 10/02/18 06:22





   Breathing  


 


morphine Allergy  Rash Verified 10/02/18 06:22


 


meperidine HCl [From Demerol] AdvReac   Verified 10/02/18 06:22











Home Medications: 


Ambulatory Orders





Diazepam [Valium -] 10 mg PO TID PRN 07/11/13 


Sumatriptan [Imitrex] 100 mg PO DAILY PRN 07/11/13 


Atorvastatin Ca [Lipitor] 80 mg PO DAILY 10/06/17 


Omeprazole 40 mg PO DAILY 10/06/17 


Quetiapine Fumarate [Seroquel] 100 mg PO TID 10/06/17 


Zolpidem Tartrate [Ambien] 10 mg PO HS PRN 10/06/17 


Amlodipine Besylate [Norvasc -] 5 mg PO DAILY #30 tablet 05/15/18 


Atenolol [Tenormin -] 25 mg PO BID #60 tablet 05/15/18 


Furosemide [Lasix -] 40 mg PO DAILY #30 tablet 05/15/18 


Gabapentin 600 mg PO TID #90 tablet 05/15/18 


Rivaroxaban [Xarelto -] 15 mg PO BID 21 Days #42 tab 05/15/18 


Orphenadrine Citrate 100 mg PO BID 07/23/18 


Oxycodone HCl/Acetaminophen [Percocet  mg Tablet] 1 each PO BID #10 

tablet MDD 2 07/23/18 


Topiramate [Topiramate ER] 50 mg PO BID 07/23/18 


Topiramate [Trokendi Xr] 50 mg PO BID 07/23/18 








Cardiac Disorders: Yes (RAPID HEARTBEAT)


COPD: No


GI Disorders: Yes (GERD)


HTN: Yes


Hypercholesterolemia: Yes


Seizures: Yes (BIPOLAR)





- Surgical History


Abdominal Surgery: Yes (bilat hernia)


Appendectomy: Yes





- Immunization History


Immunization Up to Date: No





- Suicide/Smoking/Psychosocial Hx


Smoking Status: No


Smoking History: Never smoked


Have you smoked in the past 12 months: No


Number of Cigarettes Smoked Daily: 0


Hx Alcohol Use: No


Drug/Substance Use Hx: No


Substance Use Type: None





**Review of Systems





- Review of Systems


Able to Perform ROS?: Yes


Comments:: 





10/02/18 06:22


General: denies fever, chills, night sweats, generalized weakness.


HEENT: denies sore throat, rhinorrhea, ear pain.


Heart: admits to chest tightness, chest pain, lower extremity swelling. denies 

palpitations, syncope, diaphoresis.


Respiratory: admits to shortness of breath. denies cough, sputum production, 

hemoptysis.


Abdomen: admits to abdominal pain. denies nausea, vomiting, diarrhea, 

constipation, blood in stool.


: denies dysuria, increased urinary frequency, hematuria, urinary incontinence

, flank pain.


Back: denies back pain.


Musculoskeletal: denies joint pain, muscle pain, joint swelling.


Neurological: denies headache, dizziness, numbness, tingling, weakness. 


Skin: denies rash, laceration, abrasion.








*Physical Exam





- Physical Exam


Comments: 





10/02/18 06:23


Constitutional: Well-nourished, Well-developed, appearing stated age. morbidly 

obese. 


HEENT: head is normocephalic, atraumatic. EOMI. PERRLA. 


Neck: supple. Full ROM.


Heart: regular rhythm. no murmurs, rubs or gallops. 


Lungs: clear to auscultation bilaterally. no crackles, rhonchi or wheezing. no 

stridor.


Abdomen: soft. protuberant. mild tenderness to palpation of LUQ. normal bowel 

sounds. no rebound, guarding, masses. 


Extremities: Peripheral pulses intact and equal. No lower extremity edema.


Neurological: CN 2-12 grossly intact. Moves all four extremities. 


Psych: awake, alert, oriented x3. Follows commands. Answers questions 

appropriately. 


Skin: linear healed surgical scar to left knee. 








ED Treatment Course





- LABORATORY


CBC & Chemistry Diagram: 


 10/02/18 05:59





 10/02/18 05:59





Medical Decision Making





- Medical Decision Making





10/02/18 06:24


53 year old male with PMH PE (05/2018), DVT, HTN, HLD, CAD, bipolar disorder, 

chronic pain BIBA for SOB x4 days. Last dose of Xarelto x4 days ago. 





 Initial Vital Signs











Temp Pulse Resp BP Pulse Ox


 


 99.2 F   85   19   142/67   97 


 


 10/02/18 05:00  10/02/18 05:00  10/02/18 05:00  10/02/18 05:00  10/02/18 05:00








Afebrile. No tachycardia. No hypoxia on room air. Hypertension. 





Concern for CHF, lower extremity swelling, SOB. 


- Pending CXR


- Pending BNP





Concern for ACS/arrythmia


- Pending EKG, cardiac enzymes





Low concern for Pneumonia, afebrile, no cough


- Pending CXR, CBC





Low concern for PE, SOB but no hypoxia, no tachycardia, B/L LE US Duplex 09/30 

negative for DVT


- Pending d-dimer





10/02/18 06:39


Pt reassessed, sleeping.





10/02/18 07:17


 CBC











WBC  7.7 K/mm3 (4.0-10.0)   10/02/18  05:59    


 


RBC  4.66 M/mm3 (4.00-5.60)   10/02/18  05:59    


 


Hgb  13.8 GM/dL (11.7-16.9)   10/02/18  05:59    


 


Hct  42.3 % (35.4-49)   10/02/18  05:59    


 


MCV  90.9 fl (80-96)   10/02/18  05:59    


 


MCH  29.7 pg (25.7-33.7)   10/02/18  05:59    


 


MCHC  32.7 g/dl (32.0-35.9)   10/02/18  05:59    


 


RDW  15.0 % (11.9-15.9)   10/02/18  05:59    


 


Plt Count  195 K/MM3 (134-434)   10/02/18  05:59    


 


MPV  8.6 fl (7.5-11.1)   10/02/18  05:59    


 


Absolute Neuts (auto)  3.7 K/mm3 (1.5-8.0)   10/02/18  05:59    


 


Neutrophils %  47.3 % (42.8-82.8)   10/02/18  05:59    


 


Lymphocytes %  38.0 % (8-40)   10/02/18  05:59    


 


Monocytes %  9.3 % (3.8-10.2)   10/02/18  05:59    


 


Eosinophils %  4.1 % (0-4.5)   10/02/18  05:59    


 


Basophils %  1.3 % (0-2.0)   10/02/18  05:59    


 


Nucleated RBC %  0 % (0-0)   10/02/18  05:59    








No leukocytosis. 


No anemia. 





 CMP











Sodium  139 mmol/L (136-145)   10/02/18  05:59    


 


Potassium  3.9 mmol/L (3.5-5.1)   10/02/18  05:59    


 


Chloride  103 mmol/L ()   10/02/18  05:59    


 


Carbon Dioxide  29 mmol/L (21-32)   10/02/18  05:59    


 


Anion Gap  7 MMOL/L (8-16)  L  10/02/18  05:59    


 


BUN  20 mg/dL (7-18)  H  10/02/18  05:59    


 


Creatinine  1.0 mg/dL (0.55-1.3)   10/02/18  05:59    


 


Creat Clearance w eGFR  > 60  (>60)   10/02/18  05:59    


 


Random Glucose  117 mg/dL ()  H  10/02/18  05:59    


 


Calcium  8.3 mg/dL (8.5-10.1)  L  10/02/18  05:59    


 


Total Bilirubin  0.2 mg/dL (0.2-1)   10/02/18  05:59    


 


AST  36 U/L (15-37)   10/02/18  05:59    


 


ALT  44 U/L (13-61)   10/02/18  05:59    


 


Alkaline Phosphatase  149 U/L ()  H  10/02/18  05:59    


 


Creatine Kinase  432 IU/L ()  H  10/02/18  05:59    


 


Troponin I  < 0.02 ng/ml (0.00-0.05)   10/02/18  05:59    


 


Total Protein  6.8 g/dl (6.4-8.2)   10/02/18  05:59    


 


Albumin  3.7 g/dl (3.4-5.0)   10/02/18  05:59    








No electrolyte abnormalities. 


No acute kidney injury. 


Elevated CK, similar to prior. 


Normal Troponin. 


D-dimer <200. PE unlikely. 





I signed the patient out to Dr. Saenz, who will be assuming care for the 

patient while he is in the Emergency Department. 











*DC/Admit/Observation/Transfer


Diagnosis at time of Disposition: 


 Shortness of breath








- Discharge Dispostion


Disposition: HOME


Condition at time of disposition: Good





- Referrals


Referrals: 


Mee Barakat MD [Primary Care Provider] - 





- Patient Instructions


Printed Discharge Instructions:  DI for Shortness of Breath


Additional Instructions: 


Your blood work and EKG were normal today. It is unlikely that you have another 

P.E. As you mentioned, your story is suggestive for physical deconditioning. 





Please follow up with your primary care doctor within the next 2-3 days. You 

will need to call to make an appointment. I have attached the test results from 

today's visit to this packet. Take it with you to your appointment so your 

doctor can review them.





Go to the nearest emergency department if your symptoms worsen or you feel as 

though you need an additional emergency evaluation. 


Print Language: ENGLISH





- Post Discharge Activity

## 2018-10-02 NOTE — PDOC
*Physical Exam





- Vital Signs


 Last Vital Signs











Temp Pulse Resp BP Pulse Ox


 


 99.2 F   85   19   142/67   97 


 


 10/02/18 05:00  10/02/18 05:00  10/02/18 05:00  10/02/18 05:00  10/02/18 05:00














- Physical Exam


General Appearance: Yes: Appropriately Dressed.  No: Apparent Distress


HEENT: positive: Normal Voice


Respiratory/Chest: positive: Lungs Clear, Normal Breath Sounds.  negative: 

Respiratory Distress, Accessory Muscle Use, Labored Respiration, Rapid RR, 

Decreased Breath Sounds, Paradoxal Breathing, Crackles, Rales, Rhonchi, Stridor

, Wheezing


Cardiovascular: positive: Regular Rhythm, Regular Rate


Gastrointestinal/Abdominal: positive: Soft.  negative: Tender


Extremity: positive: Pedal Edema (Trace, bilaterally. ).  negative: Calf 

Tenderness





ED Treatment Course





- LABORATORY


CBC & Chemistry Diagram: 


 10/02/18 05:59





 10/02/18 05:59





- ADDITIONAL ORDERS


Additional order review: 


 Laboratory  Results











  10/02/18 10/02/18





  05:59 05:59


 


D-Dimer   < 200


 


Sodium  139 


 


Potassium  3.9 


 


Chloride  103 


 


Carbon Dioxide  29 


 


Anion Gap  7 L 


 


BUN  20 H 


 


Creatinine  1.0 


 


Creat Clearance w eGFR  > 60 


 


Random Glucose  117 H 


 


Calcium  8.3 L 


 


Total Bilirubin  0.2 


 


AST  36 


 


ALT  44 


 


Alkaline Phosphatase  149 H 


 


Creatine Kinase  432 H 


 


Troponin I  < 0.02 


 


Total Protein  6.8 


 


Albumin  3.7 








 











  10/02/18





  05:59


 


RBC  4.66


 


MCV  90.9


 


MCHC  32.7


 


RDW  15.0


 


MPV  8.6


 


Neutrophils %  47.3


 


Lymphocytes %  38.0


 


Monocytes %  9.3


 


Eosinophils %  4.1


 


Basophils %  1.3














Medical Decision Making





- Medical Decision Making





10/02/18 07:19 Received sign out from resident Dr. Calderón. In short, pt is a 54 y/o male complaining of shortness of breath, bilateral lower extremity edema, 

and orthopnea in setting of P.E. in May 2018. D-dimer was negative. Likely 

admission concerning for [facility]ALINA vs CHF.


 


HEART Score for Major Cardiac Events from [email or web address]  on 10/2/2018


RESULT SUMMARY:


3 points


Low Score (0-3 points)


Risk of MACE of 0.9-1.7%.


INPUTS:


History > 0 = Slightly suspicious


EKG > 0 = Normal


Age > 1 = 45-64


Risk factors > 2 = ?3 risk factors or history of atherosclerotic disease


Initial troponin > 0 = ?normal limit








10/02/18 07:45 Pt reports he took 80mg of Lasix last night after his noticed 

bilateral leg swelling after taking a long walk around his neighborhood. 

Reports he also experienced chest discomfort after he returned to rest; pain 

was not exertional. Reports continued concern about possibility of another P.E. 

Discussed the low likelihood of another P.E. as his d-dimer was not elevated 

and bilateral lower extremity vascular studies were unremarkable for evidence 

of DVT two days ago. Pt requested a CTA. Discussed why this was not indicated 

in this situation. Offered to repeat his EKG and troponin. Pt initially said he 

was in agreement with plan, but he subsequently flagged his nurse and said he 

would rather go home. Pt believed to be stable to discharge home with 

outpatient follow up. Suspect symptoms are likely secondary to deconditioning 

and morbidly obese body habitus.  














*DC/Admit/Observation/Transfer


Diagnosis at time of Disposition: 


 Shortness of breath








- Discharge Dispostion


Disposition: HOME


Condition at time of disposition: Good


Decision to Admit order: No





- Referrals


Referrals: 


Mee Barakat MD [Primary Care Provider] - 





- Patient Instructions


Printed Discharge Instructions:  DI for Shortness of Breath


Additional Instructions: 


Your blood work and EKG were normal today. It is unlikely that you have another 

P.E. As you mentioned, your story is suggestive for physical deconditioning. 





Please follow up with your primary care doctor within the next 2-3 days. You 

will need to call to make an appointment. I have attached the test results from 

today's visit to this packet. Take it with you to your appointment so your 

doctor can review them.





Go to the nearest emergency department if your symptoms worsen or you feel as 

though you need an additional emergency evaluation. 


Print Language: ENGLISH





- Post Discharge Activity

## 2018-10-02 NOTE — EKG
Test Reason : 

Blood Pressure : ***/*** mmHG

Vent. Rate : 063 BPM     Atrial Rate : 063 BPM

   P-R Int : 170 ms          QRS Dur : 084 ms

    QT Int : 416 ms       P-R-T Axes : 012 -12 030 degrees

   QTc Int : 425 ms

 

NORMAL SINUS RHYTHM

NORMAL ECG

WHEN COMPARED WITH ECG OF 30-SEP-2018 20:24,

NO SIGNIFICANT CHANGE WAS FOUND

Confirmed by Yonis Figueredo MD ([id number]) on 10/2/2018 1:04:59 PM

 

Referred By:             Confirmed By:Yonis Figueredo MD

## 2018-10-02 NOTE — EKG
Test Reason : 

Blood Pressure : ***/*** mmHG

Vent. Rate : 073 BPM     Atrial Rate : 073 BPM

   P-R Int : 164 ms          QRS Dur : 082 ms

    QT Int : 398 ms       P-R-T Axes : 028 -22 039 degrees

   QTc Int : 438 ms

 

*** POOR DATA QUALITY, INTERPRETATION MAY BE ADVERSELY AFFECTED

NORMAL SINUS RHYTHM

NORMAL ECG

WHEN COMPARED WITH ECG OF 24-SEP-2018 15:03,

NO SIGNIFICANT CHANGE WAS FOUND

Confirmed by Yonis Figueredo MD ([id number]) on 10/2/2018 10:46:39 AM

 

Referred By:             Confirmed By:Yonis Figueredo MD

## 2018-10-02 NOTE — PDOC
Attending Attestation





- Resident


Resident Name: Tina Calderón





- ED Attending Attestation


I have performed the following: I have examined & evaluated the patient, The 

case was reviewed & discussed with the resident, I agree w/resident's findings 

& plan, Exceptions are as noted





- HPI


HPI: 





52 yo M history PE (resolved on f/u CTA), bipolar, CAD, HL, HTN presents with 

SOB x4 days. Associated with dec exercise tolerance. +Chest tightness and 

heaviness. Also notes B/L leg swelling. 











- Physicial Exam


PE: 





GENERAL: Awake, alert, and fully oriented, in no acute distress. Anxious.


HEAD: No signs of trauma


EYES: PERRLA, EOMI, sclera anicteric, conjunctiva clear


ENT: Auricles normal inspection, hearing grossly normal, nares patent, 

oropharynx clear without exudates. Moist mucosa


NECK: Normal ROM, supple, no lymphadenopathy, JVD, or masses


LUNGS: Breath sounds equal, clear to auscultation bilaterally.  No wheezes, and 

no crackles


HEART: Regular rate and rhythm, normal S1 and S2, no murmurs, rubs or gallops


ABDOMEN: Soft, nontender, normoactive bowel sounds.  No guarding, no rebound.  

No masses


EXTREMITIES: Normal range of motion, 1+ pitting edema BLE.  No clubbing or 

cyanosis. No cords, erythema. No calf tenderness.


NEUROLOGICAL: Cranial nerves II through XII grossly intact.  Normal speech, 

normal gait


SKIN: Warm, Dry, normal turgor, no rashes or lesions noted.











- Medical Decision Making





Pt with history of prior PE, however, based on clinical presentation, PE is 

lower on differential. Based on the symmetric leg swelling, would r/o CHF. D-

dimer sent, as well as basic labs, cardiac panel, BNP. Endorsed to Dr. Rossi at 

shift change.

## 2018-11-08 ENCOUNTER — HOSPITAL ENCOUNTER (OUTPATIENT)
Dept: HOSPITAL 74 - JER | Age: 53
Setting detail: OBSERVATION
LOS: 2 days | Discharge: HOME | End: 2018-11-10
Attending: INTERNAL MEDICINE | Admitting: INTERNAL MEDICINE
Payer: COMMERCIAL

## 2018-11-08 VITALS — BODY MASS INDEX: 40.1 KG/M2

## 2018-11-08 DIAGNOSIS — I25.10: ICD-10-CM

## 2018-11-08 DIAGNOSIS — Z98.61: ICD-10-CM

## 2018-11-08 DIAGNOSIS — Z79.01: ICD-10-CM

## 2018-11-08 DIAGNOSIS — E78.5: ICD-10-CM

## 2018-11-08 DIAGNOSIS — I26.99: ICD-10-CM

## 2018-11-08 DIAGNOSIS — I11.9: ICD-10-CM

## 2018-11-08 DIAGNOSIS — Z88.6: ICD-10-CM

## 2018-11-08 DIAGNOSIS — R07.9: Primary | ICD-10-CM

## 2018-11-08 DIAGNOSIS — E66.9: ICD-10-CM

## 2018-11-08 DIAGNOSIS — F31.9: ICD-10-CM

## 2018-11-08 DIAGNOSIS — K21.9: ICD-10-CM

## 2018-11-08 LAB
ALBUMIN SERPL-MCNC: 4.6 G/DL (ref 3.4–5)
ALP SERPL-CCNC: 139 U/L (ref 45–117)
ALT SERPL-CCNC: 51 U/L (ref 13–61)
ANION GAP SERPL CALC-SCNC: 9 MMOL/L (ref 8–16)
APTT BLD: 33.8 SECONDS (ref 25.2–36.5)
AST SERPL-CCNC: 34 U/L (ref 15–37)
BASOPHILS # BLD: 0.2 % (ref 0–2)
BILIRUB SERPL-MCNC: 0.5 MG/DL (ref 0.2–1)
BNP SERPL-MCNC: 85.1 PG/ML (ref 5–125)
BUN SERPL-MCNC: 14 MG/DL (ref 7–18)
CALCIUM SERPL-MCNC: 8.6 MG/DL (ref 8.5–10.1)
CHLORIDE SERPL-SCNC: 105 MMOL/L (ref 98–107)
CO2 SERPL-SCNC: 25 MMOL/L (ref 21–32)
CREAT SERPL-MCNC: 0.9 MG/DL (ref 0.55–1.3)
DEPRECATED RDW RBC AUTO: 14.6 % (ref 11.9–15.9)
EOSINOPHIL # BLD: 0.8 % (ref 0–4.5)
GLUCOSE SERPL-MCNC: 94 MG/DL (ref 74–106)
HCT VFR BLD CALC: 45.6 % (ref 35.4–49)
HGB BLD-MCNC: 15.2 GM/DL (ref 11.7–16.9)
INR BLD: 1.52 (ref 0.83–1.09)
LYMPHOCYTES # BLD: 31.6 % (ref 8–40)
MCH RBC QN AUTO: 30.3 PG (ref 25.7–33.7)
MCHC RBC AUTO-ENTMCNC: 33.5 G/DL (ref 32–35.9)
MCV RBC: 90.4 FL (ref 80–96)
MONOCYTES # BLD AUTO: 9.1 % (ref 3.8–10.2)
NEUTROPHILS # BLD: 58.3 % (ref 42.8–82.8)
PLATELET # BLD AUTO: 224 K/MM3 (ref 134–434)
PMV BLD: 8.7 FL (ref 7.5–11.1)
POTASSIUM SERPLBLD-SCNC: 4.2 MMOL/L (ref 3.5–5.1)
PROT SERPL-MCNC: 8.1 G/DL (ref 6.4–8.2)
PT PNL PPP: 18 SEC (ref 9.7–13)
RBC # BLD AUTO: 5.04 M/MM3 (ref 4–5.6)
SODIUM SERPL-SCNC: 139 MMOL/L (ref 136–145)
WBC # BLD AUTO: 10.8 K/MM3 (ref 4–10)

## 2018-11-08 PROCEDURE — G0378 HOSPITAL OBSERVATION PER HR: HCPCS

## 2018-11-08 NOTE — PDOC
Attending Attestation





- Resident


Resident Name: Sydni Smith





- ED Attending Attestation


I have performed the following: I have examined & evaluated the patient, The 

case was reviewed & discussed with the resident, I agree w/resident's findings 

& plan





- HPI


HPI: 





11/08/18 21:04


53-year-old male with history of multiple medical problems including obesity, 

hypertension, post-op PE diagnosed in May on xarelto, reportedly wnl cath 3.5y 

ago scheduled for nuclear stress next week with Dr. Mckeon, presents now with two 

episodes (one last night, one today) of sharp L chest pain radiating to his L 

arm, associated with diaphoresis and shortness of breath. + new ANGELES while 

walking with his brother today. noted b/l leg swelling last week resolved with 

lasix, reportedly baseline for him. reports compliance with his xarelto. 








- Physicial Exam


PE: 





11/08/18 21:06


Vital signs within normal limits including heart rate and O2 sat


Obese male seated comfortably in stretcher, speaking full sentences


Heart is regular, lungs are clear


Right leg is larger than left, but nontender and baseline per patient





- Medical Decision Making





11/08/18 21:06


53-year-old male with history of PE and several risk factors for ACS presents 

with concerning chest pain and dyspnea 2 episodes since last night, 

asymptomatic now with normal vital signs. Will need ACS workup as well as rule 

out of progression of prior PE or new PE.


Labs, EKG, chest x-ray


CTA chest to evaluate PE


Telemetry Admission for further evaluation and management





**Heart Score/ECG Review





- History


History: Moderately suspicious





- Electrocardiogram


EKG: Normal





- Age


Age: 45-65





- Risk Factors


Based on the list above the patient has:: >/=3 risk factors or Hx 

atherosclerotic disease





- Troponin


Troponin: </= normal limit





- Score


Heart Score - Total: 4


  ** #1


ECG reviewed & interpreted by me at: 19:09


General ECG Interpretation: Sinus Rhythm, Normal Rate (60), Normal Intervals (

qtc 400), No acute ischemic changes


Compared to previous ECG there are: No significant change

## 2018-11-08 NOTE — PDOC
History of Present Illness





- General


Chief Complaint: Shortness of Breath


Stated Complaint: CHEST PAIN


Time Seen by Provider: 11/08/18 19:39





- History of Present Illness


Initial Comments: 


54yo M with PMH of angina, PE, HTN, HLD, obesity, bipolar disorder presenting 

with chest pain and shortness of breath. Patient reports that he was under 

significant stress last night when he felt sudden chest pain rated 8/10 that he 

described as stabbing and getting punched in the chest. The pain radiates 

up his clavicle and down his arm which feels numb. He says he didnt feel good

 and took a nitroglycerin. Patient also endorses diaphoresis and nausea, but 

no vomiting. No personal history of MI or smoking. Father had an MI in his 80s. 

Patient also reports foot swelling for which he took furosemide with relief and 

dyspnea on exertion for the past three or four days. No history of heart failure

, asthma, or COPD. Dr. Mckeon is his cardiologist, an ECHO was performed a year 

ago. His PE was in May 2018 for which he reports taking Xarelto as instructed, 

however did not take it for five days in the past two weeks for a dental 

procedure. Patient reports that he has had a soft plaque in his LAD for over 

twenty years. No fevers, but endorses chills. 











Past History





- Past Medical History


Allergies/Adverse Reactions: 


 Allergies











Allergy/AdvReac Type Severity Reaction Status Date / Time


 


ketorolac [From Toradol] Allergy Severe Difficulty Verified 11/08/18 19:03





   Breathing  


 


tramadol Allergy Severe Difficulty Verified 11/08/18 19:03





   Breathing  


 


trazodone Allergy Severe Difficulty Verified 11/08/18 19:03





   Breathing  


 


morphine Allergy  Rash Verified 11/08/18 19:03


 


meperidine HCl [From Demerol] AdvReac   Verified 11/08/18 19:03











Home Medications: 


Ambulatory Orders





Diazepam [Valium -] 10 mg PO TID PRN 07/11/13 


Sumatriptan [Imitrex] 100 mg PO DAILY PRN 07/11/13 


Atorvastatin Ca [Lipitor] 80 mg PO DAILY 10/06/17 


Omeprazole 40 mg PO DAILY 10/06/17 


Quetiapine Fumarate [Seroquel] 100 mg PO TID 10/06/17 


Zolpidem Tartrate [Ambien] 10 mg PO HS PRN 10/06/17 


Atenolol [Tenormin -] 25 mg PO BID #60 tablet 05/15/18 


Furosemide [Lasix -] 40 mg PO DAILY #30 tablet 05/15/18 


Gabapentin 600 mg PO TID #90 tablet 05/15/18 


Orphenadrine Citrate [Orphenadrine Citrate ER] 100 mg PO BID 07/23/18 


Topiramate [Topiramate ER] 50 mg PO TID 07/23/18 


Oxycodone HCl/Acetaminophen [Percocet  mg Tablet] 1 each PO TID MDD 2 11/ 08/18 


Rivaroxaban [Xarelto -] 20 mg PO DAILY 11/08/18 








Cardiac Disorders: Yes (RAPID HEARTBEAT)


COPD: No


GI Disorders: Yes (GERD)


HTN: Yes


Hypercholesterolemia: Yes


Seizures: Yes (BIPOLAR)





- Surgical History


Abdominal Surgery: Yes (bilat hernia)


Appendectomy: Yes





- Immunization History


Immunization Up to Date: No





- Suicide/Smoking/Psychosocial Hx


Smoking Status: No


Smoking History: Never smoked


Have you smoked in the past 12 months: No


Number of Cigarettes Smoked Daily: 0


Hx Alcohol Use: No


Drug/Substance Use Hx: No


Substance Use Type: None





**Review of Systems





- Review of Systems


Comments:: 


Constitutional: no fever, +chills


HEENT: no throat pain, +dysphagia


Cardiovascular: +chest pain, no palpitations


Respiratory: +cough, +shortness of breath


Gastrointestinal: no abdominal pain, +nausea, no vomiting, +diarrhea


Genitourinary: no dysuria, no frequency


Musculoskeletal: +myalgia, +arthralgia


Skin: no rash, no itching


Neurologic: +headache, no dizziness








*Physical Exam





- Vital Signs


 Last Vital Signs











Temp Pulse Resp BP Pulse Ox


 


 98.5 F   75   20   129/89   98 


 


 11/08/18 19:03  11/08/18 19:03  11/08/18 19:03  11/08/18 19:03  11/08/18 19:03














- Physical Exam


Comments: 


General: Awake, alert, and fully oriented, in no acute distress


Head: No signs of trauma


Eyes: EOMI, sclera anicteric


ENT: Moist mucus membranes


Neck: Normal ROM, supple


Lungs: Lungs clear, Normal breath sounds


Cardio: Regular rhythm, S1 and S2 present


Abdomen: Soft, nontender. No guarding, no rebound, no masses


Extremities: Normal range of motion, Distal pulses present


SKIN: Warm, Dry, normal turgor


Neurologic: Cranial nerves II through XII grossly intact. Normal speech








ED Treatment Course





- LABORATORY


CBC & Chemistry Diagram: 


 11/08/18 19:57





 11/08/18 22:00





- ADDITIONAL ORDERS


Additional order review: 


 Laboratory  Results











  11/08/18 11/08/18





  19:57 19:57


 


PT with INR   18.00 H


 


INR   1.52 H


 


PTT (Actin FS)   33.8


 


Sodium  Cancelled 


 


Potassium  Cancelled 


 


Chloride  Cancelled 


 


Carbon Dioxide  Cancelled 


 


Anion Gap  Cancelled 


 


BUN  Cancelled 


 


Creatinine  Cancelled 


 


Creat Clearance w eGFR  Cancelled 


 


Random Glucose  Cancelled 


 


Calcium  Cancelled 


 


Total Bilirubin  Cancelled 


 


AST  Cancelled 


 


ALT  Cancelled 


 


Alkaline Phosphatase  Cancelled 


 


Troponin I  Cancelled 


 


B-Natriuretic Peptide  Cancelled 


 


Total Protein  Cancelled 


 


Albumin  Cancelled 








 











  11/08/18





  19:57


 


RBC  5.04


 


MCV  90.4


 


MCHC  33.5


 


RDW  14.6


 


MPV  8.7


 


Neutrophils %  58.3  D


 


Lymphocytes %  31.6


 


Monocytes %  9.1


 


Eosinophils %  0.8  D


 


Basophils %  0.2














- RADIOLOGY


Radiology Studies Ordered: 














 Category Date Time Status


 


 CHEST CTA [CT] Stat CT Scan  11/08/18 20:18 Ordered


 


 CHEST X-RAY PORTABLE* [RAD] Stat Radiology  11/08/18 19:52 Taken














- Medications


Given in the ED: 


ED Medications














Discontinued Medications














Generic Name Dose Route Start Last Admin





  Trade Name James  PRN Reason Stop Dose Admin


 


Fentanyl  75 mcg  11/08/18 20:37  11/08/18 21:04





  Sublimaze Injection -  IVPUSH  11/08/18 20:38  75 mcg





  ONCE ONE   Administration





     





     





     





     


 


Morphine Sulfate  4 mg  11/08/18 20:16  11/08/18 21:04





  Morphine Injection -  IVPUSH  11/08/18 20:17  Not Given





  ONCE ONE   





     





     





     





     


 


Ondansetron HCl  4 mg  11/08/18 20:16  11/08/18 21:04





  Zofran Injection  IVPUSH  11/08/18 20:17  4 mg





  ONCE ONE   Administration





     





     





     





     














Medical Decision Making





- Medical Decision Making


54yo M with PMH of angina, PE, HTN, HLD, obesity, bipolar disorder presenting 

with chest pain and shortness of breath. 


-Labs


-EKG: rate 60, QTc 400, NSR


-CXR: pending report


-HEART Score is at least 4 given patient's history, age, and risk factors 





Chemistry hemolyzed re-ordered   


Ultrasound guided IV placed


Patient given fentanyl and reporting diminished pain, now 5/10; still nauseous


11/08/18 21:22





Upon patient's request, called patient's  Kenneth Lanier to say that 

patient could not appear at his deposition on Tuesday 11/08/18 22:22





WBC=10.8, Tpn negative, BNP= 85.1


Discussed case with Dr. Jeronimo who accepted patient for admission


11/09/18 00:10


 


CTA negative for PE


11/09/18 03:20











*DC/Admit/Observation/Transfer


Diagnosis at time of Disposition: 


 Chest pain








- Discharge Dispostion


Condition at time of disposition: Guarded


Decision to Admit order: Yes





- Referrals





- Patient Instructions





- Post Discharge Activity

## 2018-11-08 NOTE — PDOC
History of Present Illness





- General


Stated Complaint: CHEST PAIN





Past History





- Past Medical History


Allergies/Adverse Reactions: 


 Allergies











Allergy/AdvReac Type Severity Reaction Status Date / Time


 


ketorolac [From Toradol] Allergy Severe Difficulty Verified 10/02/18 06:22





   Breathing  


 


tramadol Allergy Severe Difficulty Verified 10/02/18 06:22





   Breathing  


 


trazodone Allergy Severe Difficulty Verified 10/02/18 06:22





   Breathing  


 


morphine Allergy  Rash Verified 10/02/18 06:22


 


meperidine HCl [From Demerol] AdvReac   Verified 10/02/18 06:22











Home Medications: 


Ambulatory Orders





Diazepam [Valium -] 10 mg PO TID PRN 07/11/13 


Sumatriptan [Imitrex] 100 mg PO DAILY PRN 07/11/13 


Atorvastatin Ca [Lipitor] 80 mg PO DAILY 10/06/17 


Omeprazole 40 mg PO DAILY 10/06/17 


Quetiapine Fumarate [Seroquel] 100 mg PO TID 10/06/17 


Zolpidem Tartrate [Ambien] 10 mg PO HS PRN 10/06/17 


Amlodipine Besylate [Norvasc -] 5 mg PO DAILY #30 tablet 05/15/18 


Atenolol [Tenormin -] 25 mg PO BID #60 tablet 05/15/18 


Furosemide [Lasix -] 40 mg PO DAILY #30 tablet 05/15/18 


Gabapentin 600 mg PO TID #90 tablet 05/15/18 


Rivaroxaban [Xarelto -] 15 mg PO BID 21 Days #42 tab 05/15/18 


Orphenadrine Citrate 100 mg PO BID 07/23/18 


Oxycodone HCl/Acetaminophen [Percocet  mg Tablet] 1 each PO BID #10 

tablet MDD 2 07/23/18 


Topiramate [Topiramate ER] 50 mg PO BID 07/23/18 


Topiramate [Trokendi Xr] 50 mg PO BID 07/23/18 








Cardiac Disorders: Yes (RAPID HEARTBEAT)


COPD: No


GI Disorders: Yes (GERD)


HTN: Yes


Hypercholesterolemia: Yes


Seizures: Yes (BIPOLAR)





- Surgical History


Abdominal Surgery: Yes (bilat hernia)


Appendectomy: Yes





- Immunization History


Immunization Up to Date: No





- Suicide/Smoking/Psychosocial Hx


Smoking Status: No


Smoking History: Never smoked


Have you smoked in the past 12 months: No


Number of Cigarettes Smoked Daily: 0


Hx Alcohol Use: No


Drug/Substance Use Hx: No


Substance Use Type: None

## 2018-11-09 PROCEDURE — 3E033GC INTRODUCTION OF OTHER THERAPEUTIC SUBSTANCE INTO PERIPHERAL VEIN, PERCUTANEOUS APPROACH: ICD-10-PCS | Performed by: INTERNAL MEDICINE

## 2018-11-09 PROCEDURE — 3E033NZ INTRODUCTION OF ANALGESICS, HYPNOTICS, SEDATIVES INTO PERIPHERAL VEIN, PERCUTANEOUS APPROACH: ICD-10-PCS | Performed by: INTERNAL MEDICINE

## 2018-11-09 RX ADMIN — QUETIAPINE FUMARATE SCH MG: 100 TABLET ORAL at 23:07

## 2018-11-09 RX ADMIN — QUETIAPINE FUMARATE SCH MG: 100 TABLET ORAL at 14:21

## 2018-11-09 RX ADMIN — ATENOLOL SCH MG: 25 TABLET ORAL at 23:07

## 2018-11-09 RX ADMIN — ATENOLOL SCH MG: 25 TABLET ORAL at 11:32

## 2018-11-09 RX ADMIN — GABAPENTIN SCH MG: 300 CAPSULE ORAL at 14:21

## 2018-11-09 RX ADMIN — RIVAROXABAN SCH MG: 20 TABLET, FILM COATED ORAL at 17:26

## 2018-11-09 RX ADMIN — TOPIRAMATE SCH MG: 25 TABLET, FILM COATED ORAL at 23:05

## 2018-11-09 RX ADMIN — FUROSEMIDE SCH MG: 40 TABLET ORAL at 11:32

## 2018-11-09 RX ADMIN — GABAPENTIN SCH MG: 300 CAPSULE ORAL at 23:07

## 2018-11-09 RX ADMIN — PANTOPRAZOLE SODIUM SCH MG: 40 TABLET, DELAYED RELEASE ORAL at 11:32

## 2018-11-09 NOTE — ECHO
______________________________________________________________________________



Name: ALLEN NAVEEN                                Exam:Adult Echocardiogram

MRN: [id number]         Study Date: 2018 08:26 AM

Age: 53 yrs

______________________________________________________________________________



Reason For Study: LVSF

Height: 71 in        Weight: 280 lb        BSA: 2.4 m2



______________________________________________________________________________



MMode/2D Measurements & Calculations

IVSd: 0.96 cm                                          Ao root diam: 3.0 cm

LVIDd: 4.8 cm                                          LA dimension: 3.7 cm

LVIDs: 3.4 cm

LVPWd: 0.90 cm



________________________________________________________

EDV(Teich): 109.2 ml

ESV(Teich): 48.7 ml



Doppler Measurements & Calculations

MV E max leonardo: 54.8 cm/sec                                 TR max leonardo: 210.4 cm/sec

MV A max leonardo: 67.1 cm/sec                                 TR max P.7 mmHg

MV E/A: 0.82

MV dec time: 0.23 sec



___________________________________________________________

Med Peak E' Leonardo: 5.1 cm/sec

Med E/e': 10.7

Lat Peak E' Leonardo: 9.0 cm/sec

Lat E/e': 6.1





______________________________________________________________________________

Left Ventricle

Ejection Fraction = 50-55%. The transmitral spectral Doppler flow pattern is suggestive of impaired L
V

relaxation.

Right Ventricle

The right ventricle is grossly normal size. The right ventricular systolic function is grossly normal
.

Atria

Normal left and right atrial size and function.

Mitral Valve

The mitral valve is grossly normal. There is no mitral valve stenosis. There is trace to mild mitral

regurgitation.

Tricuspid Valve

The tricuspid valve is normal in structure and function. There is mild tricuspid regurgitation.

Aortic Valve

The aortic valve opens well. No hemodynamically significant valvular aortic stenosis. No aortic regur
gitation

is present.

Pulmonic Valve

The pulmonic valve is not well seen, but is grossly normal. There is no pulmonic valvular stenosis. T
here is

no pulmonic valvular regurgitation.

Great Vessels

The aortic root is normal size.

Pericardium/Pleura

There is no pericardial effusion.



______________________________________________________________________________



Interpretation Summary

Ejection Fraction = 50-55%.

The transmitral spectral Doppler flow pattern is suggestive of impaired LV relaxation.

There is trace to mild mitral regurgitation.

There is mild tricuspid regurgitation.

There is no pericardial effusion.







MD Charles *Jayson 2018 10:21 AM

## 2018-11-09 NOTE — HP
Admitting History and Physical





- Primary Care Physician


PCP: Osiel Barakat





- Admission


Chief Complaint: chest pain


History of Present Illness: 





woke up with sharp stabbing retrosternal chest pain yesterday, resolved with sl 

NTG and went back to sleep. During the day got diaphoretic after walking a bit 

and pain recurred, so he came to er for further evaluation.


exertional dyspnea is chronic, not in shape, obese.


know soft plaque in LAD based on card.cath done 3.5 years ago as per pt, no 

stents.








History Source: Patient


Limitations to Obtaining History: No Limitations





- Past Medical History


CNS: Yes: Migraine


Cardiovascular: Yes: HTN, Hyperlipdemia


Pulmonary: Yes: Pulmonary Embolus ()


Psych: Yes: Bipolar, Depression


Musculoskeletal: Yes: Chronic low back pain, Osteoarthritis


Additional Past Medical History: 





LLE DVT  after knee injury





- Past Surgical History


Past Surgical History: Yes: Appendectomy, Hernia Repair (bilateral)


Additional Past Surgical History: 





right shoulder sg 


left achilles tendon repair 


right knee reconstructive sg 


right shoulder sg. 


left meniscus [id number], left knee surgery with staph infection , left 

knee meniscus tear repair with quad. sg 3.2018








- Smoking History


Smoking history: Never smoked


Have you smoked in the past 12 months: No


Aproximately how many cigarettes per day: 0





- Alcohol/Substance Use


Hx Alcohol Use: No


History of Substance Use: reports: None





- Social History


ADL: Independent


History of Recent Travel: No





Home Medications





- Allergies


Allergies/Adverse Reactions: 


 Allergies











Allergy/AdvReac Type Severity Reaction Status Date / Time


 


ketorolac [From Toradol] Allergy Severe Difficulty Verified 18 19:03





   Breathing  


 


tramadol Allergy Severe Difficulty Verified 18 19:03





   Breathing  


 


trazodone Allergy Severe Difficulty Verified 18 19:03





   Breathing  


 


morphine Allergy  Rash Verified 18 19:03


 


meperidine HCl [From Demerol] AdvReac   Verified 18 19:03














- Home Medications


Home Medications: 


Ambulatory Orders





Diazepam [Valium -] 10 mg PO TID PRN 13 


Sumatriptan [Imitrex] 100 mg PO DAILY PRN 13 


Atorvastatin Ca [Lipitor] 80 mg PO DAILY 10/06/17 


Omeprazole 40 mg PO DAILY 10/06/17 


Quetiapine Fumarate [Seroquel] 100 mg PO TID 10/06/17 


Zolpidem Tartrate [Ambien] 10 mg PO HS PRN 10/06/17 


Atenolol [Tenormin -] 25 mg PO BID #60 tablet 05/15/18 


Furosemide [Lasix -] 40 mg PO DAILY #30 tablet 05/15/18 


Gabapentin 600 mg PO TID #90 tablet 05/15/18 


Orphenadrine Citrate [Orphenadrine Citrate ER] 100 mg PO BID 18 


Topiramate [Topiramate ER] 50 mg PO TID 18 


Oxycodone HCl/Acetaminophen [Percocet  mg Tablet] 1 each PO TID MDD 2  


Rivaroxaban [Xarelto -] 20 mg PO DAILY 18 











Family Disease History





- Family Disease History


Family Disease History: Heart Disease: Father ( at 91), CA: Mother (ovarian)





Review of Systems





- Review of Systems


Constitutional: reports: No Symptoms


Eyes: reports: No Symptoms


HENT: reports: No Symptoms


Neck: reports: Other (chronic pain)


Cardiovascular: reports: Chest Pain (as noted)


Respiratory: reports: Exercise Intolerance, SOB on Exertion.  denies: Cough, 

Hemoptysis, Orthopnea


Gastrointestinal: reports: No Symptoms


Genitourinary: reports: No Symptoms


Musculoskeletal: reports: Back Pain, Joint Pain (chronic)


Neurological: reports: No Symptoms


Endocrine: reports: No Symptoms


Hematology/Lymphatic: reports: No Symptoms


Psychiatric: reports: No Symptoms (bipolar controlled, in good spirits)





Physical Examination


Vital Signs: 


 Vital Signs











Temperature  97.7 F   18 05:34


 


Pulse Rate  65   18 05:34


 


Respiratory Rate  17   18 05:34


 


Blood Pressure  111/60   18 05:34


 


O2 Sat by Pulse Oximetry (%)  97   18 05:34











Constitutional: Yes: No Distress, Obese


Eyes: Yes: Conjunctiva Clear


HENT: Yes: Atraumatic, Normocephalic


Neck: Yes: Trachea Midline


Cardiovascular: Yes: Regular Rate and Rhythm.  No: Tachycardia, Murmur


Respiratory: Yes: Regular, CTA Bilaterally


Gastrointestinal: Yes: Normal Bowel Sounds, Soft, Abdomen, Obese


Extremities: Yes: WNL


Edema: No


Peripheral Pulses WNL: Yes


Neurological: Yes: WNL


...Motor Strength: WNL


Psychiatric: Yes: WNL


Labs: 


 CBC, BMP





 18 19:57 





 18 22:00 





first set of troponin, cpk negative





Imaging





- Results


Chest X-ray: Report Reviewed


Cat Scan: Report Reviewed (CTA negative for pulmonary embolism)


EKG: Report Reviewed (normal ekg)





Problem List





- Problems


(1) Obesity (BMI 30-39.9)


Code(s): E66.9 - OBESITY, UNSPECIFIED   





(2) Chest pain


Code(s): R07.9 - CHEST PAIN, UNSPECIFIED   


Qualifiers: 


   Chest pain type: unspecified   Qualified Code(s): R07.9 - Chest pain, 

unspecified   





(3) Pulmonary embolism


Code(s): I26.99 - OTHER PULMONARY EMBOLISM WITHOUT ACUTE COR PULMONALE   


Qualifiers: 


   Chronicity: unspecified 





(4) Hyperlipidemia


Code(s): E78.5 - HYPERLIPIDEMIA, UNSPECIFIED   


Qualifiers: 


   Hyperlipidemia type: unspecified   Qualified Code(s): E78.5 - Hyperlipidemia

, unspecified   





(5) Hypertension


Code(s): I10 - ESSENTIAL (PRIMARY) HYPERTENSION   


Qualifiers: 


   Hypertension type: essential hypertension   Qualified Code(s): I10 - 

Essential (primary) hypertension   





Assessment/Plan


r/o CAD


serial cardiac enzymes


echo


stress test


cardiology follow up requested

## 2018-11-09 NOTE — CON.CARD
Consult


Consult Specialty:: Cardiology


Referred by:: Mee Dill MD


Reason for Consultation:: Chest pain





- History of Present Illness


Chief Complaint: Chest pain


History of Present Illness: 


Patient is a 53 year old male with underlying history of CAD (previous cardiac 

catheterization and followed by Richie Duffy MD at [facility]), hypertension, 

bipolar disorder, depression, multiple joint injuries and surgeries, migraines 

and PTE on Xarelto who presents with sharp, stabbing atypical left sided chest 

pain, dyspnea and palpitations worse with increasing anxiety, denies worsening 

dyspnea on exertion, near or true syncope, orthopnea, PND or LE edema, ruled 

out for MI.








- History Source


History Provided By: Patient


Limitations to Obtaining History: No Limitations





- Past Medical History


CNS: Yes: Migraine


Cardio/Vascular: Yes: HTN, Hyperlipdemia


Pulmonary: Yes: Pulmonary Embolus ()


Psych: Yes: Bipolar, Depression


Musculoskeletal: Yes: Chronic low back pain, Osteoarthritis





- Past Surgical History


Past Surgical History: Yes: Appendectomy, Hernia Repair (bilateral)





- Alcohol/Substance Use


Hx Alcohol Use: No


History of Substance Use: reports: None





- Smoking History


Smoking history: Never smoked


Have you smoked in the past 12 months: No


Aproximately how many cigarettes per day: 0





- Social History


ADL: Independent


History of Recent Travel: No





Home Medications





- Allergies


Allergies/Adverse Reactions: 


 Allergies











Allergy/AdvReac Type Severity Reaction Status Date / Time


 


ketorolac [From Toradol] Allergy Severe Difficulty Verified 18 19:03





   Breathing  


 


tramadol Allergy Severe Difficulty Verified 18 19:03





   Breathing  


 


trazodone Allergy Severe Difficulty Verified 18 19:03





   Breathing  


 


morphine Allergy  Rash Verified 18 19:03


 


meperidine HCl [From Demerol] AdvReac   Verified 18 19:03














- Home Medications


Home Medications: 


Ambulatory Orders





Diazepam [Valium -] 10 mg PO TID PRN 13 


Sumatriptan [Imitrex] 100 mg PO DAILY PRN 13 


Atorvastatin Ca [Lipitor] 80 mg PO DAILY 10/06/17 


Omeprazole 40 mg PO DAILY 10/06/17 


Quetiapine Fumarate [Seroquel] 100 mg PO TID 10/06/17 


Zolpidem Tartrate [Ambien] 10 mg PO HS PRN 10/06/17 


Atenolol [Tenormin -] 25 mg PO BID #60 tablet 05/15/18 


Furosemide [Lasix -] 40 mg PO DAILY #30 tablet 05/15/18 


Gabapentin 600 mg PO TID #90 tablet 05/15/18 


Orphenadrine Citrate [Orphenadrine Citrate ER] 100 mg PO BID 18 


Topiramate [Topiramate ER] 50 mg PO TID 18 


Oxycodone HCl/Acetaminophen [Percocet  mg Tablet] 1 each PO TID MDD 2  


Rivaroxaban [Xarelto -] 20 mg PO DAILY 18 











Family Disease History





- Family Disease History


Family Disease History: Heart Disease: Father ( at 91), CA: Mother (ovarian)





Review of Systems





- Review of Systems


Cardiovascular: reports: Chest Pain, Palpitations, Shortness of Breath


Vital Signs: 


 Vital Signs











Temperature  98 F   18 15:37


 


Pulse Rate  72   18 15:37


 


Respiratory Rate  18   18 15:37


 


Blood Pressure  110/74   18 15:37


 


O2 Sat by Pulse Oximetry (%)  95   18 12:57











Constitutional: Yes: No Distress, Calm


Neck: Yes: Supple


Respiratory: Yes: Regular, CTA Bilaterally


Gastrointestinal: Yes: Normal Bowel Sounds, Soft, Abdomen, Obese


Cardiovascular: Yes: Regular Rate and Rhythm


JVD: No


Carotid Bruit: No


Heart Sounds: Yes: S1, S2


Edema: No





- Other Data


Labs, Other Data: 


 CBC, BMP





 18 19:57 





 18 22:00 





 INR, PTT











INR  1.52  (0.83-1.09)  H  18  19:57    








 Troponin, BNP











  18





  19:57 20:52 20:52


 


Troponin I  Cancelled  Cancelled 


 


B-Natriuretic Peptide  Cancelled   Cancelled














  18





  22:00 09:05


 


Troponin I  < 0.02  < 0.02


 


B-Natriuretic Peptide  85.1 








 Troponin, BNP











  18





  19:57 20:52 20:52


 


Troponin I  Cancelled  Cancelled 


 


B-Natriuretic Peptide  Cancelled   Cancelled














  11/08/18 11/09/18





  22:00 09:05


 


Troponin I  < 0.02  < 0.02


 


B-Natriuretic Peptide  85.1 











NSR @ 60 without ST-T changes


Ejection Fraction %: LVEF > or = 40 %





Imaging





- Results


Chest X-ray: Report Reviewed (NAD)


Cat Scan: Report Reviewed (Neg for PE)





Problem List





- Problems


(1) Chest pain


Code(s): R07.9 - CHEST PAIN, UNSPECIFIED   


Qualifiers: 


   Chest pain type: unspecified   Qualified Code(s): R07.9 - Chest pain, 

unspecified   





(2) Pulmonary embolism


Code(s): I26.99 - OTHER PULMONARY EMBOLISM WITHOUT ACUTE COR PULMONALE   


Qualifiers: 


   Chronicity: unspecified 





(3) Hyperlipidemia


Code(s): E78.5 - HYPERLIPIDEMIA, UNSPECIFIED   


Qualifiers: 


   Hyperlipidemia type: pure hypercholesterolemia   Qualified Code(s): E78.00 - 

Pure hypercholesterolemia, unspecified; E78.0 - Pure hypercholesterolemia   





(4) Hypertension


Code(s): I10 - ESSENTIAL (PRIMARY) HYPERTENSION   


Qualifiers: 


   Hypertension type: essential hypertension   Qualified Code(s): I10 - 

Essential (primary) hypertension   





(5) Coronary artery disease


Code(s): I25.10 - ATHSCL HEART DISEASE OF NATIVE CORONARY ARTERY W/O ANG PCTRS 

  


Qualifiers: 


   Coronary Disease-Associated Artery/Lesion type: native artery   Native vs. 

transplanted heart: native heart   Associated angina: without angina   

Qualified Code(s): I25.10 - Atherosclerotic heart disease of native coronary 

artery without angina pectoris   





(6) Diastolic dysfunction


Code(s): I51.9 - HEART DISEASE, UNSPECIFIED   





Assessment/Plan


2018 Low normal LVEF 50-55%, abnormal LV compliance, mild TR, normal RV 

size and fxn





1. Atypical chest pain


2. Pulmonary embolism on NOAC


3. Non-obstructive CAD, angina pectoris


4. Hypertension


5. Hypercholesterolemia


6. Diastolic dysfunction


7. Bipolar/depression


8. Degenerative joint disease and injuries





PLAN:


1. Ruled out for MI, patient has appointment for 2 day stress protocol next 

Tuesday


2. Continue Lipitor 80 qd, Atenolol 25 bid, Xarelto 20 qd


3. May d/c with f/u in office [phone]

## 2018-11-09 NOTE — EKG
Test Reason : 

Blood Pressure : ***/*** mmHG

Vent. Rate : 060 BPM     Atrial Rate : 060 BPM

   P-R Int : 166 ms          QRS Dur : 080 ms

    QT Int : 400 ms       P-R-T Axes : 013 -20 044 degrees

   QTc Int : 400 ms

 

NORMAL SINUS RHYTHM

NORMAL ECG

WHEN COMPARED WITH ECG OF 02-OCT-2018 08:19,

NO SIGNIFICANT CHANGE WAS FOUND

Confirmed by TARIK HERNANDEZ MD ([id number]) on 11/9/2018 7:40:38 AM

 

Referred By:             Confirmed By:TARIK HERNANDEZ MD

## 2018-11-10 VITALS — DIASTOLIC BLOOD PRESSURE: 65 MMHG | HEART RATE: 67 BPM | SYSTOLIC BLOOD PRESSURE: 107 MMHG

## 2018-11-10 VITALS — TEMPERATURE: 97.9 F

## 2018-11-10 RX ADMIN — TOPIRAMATE SCH MG: 25 TABLET, FILM COATED ORAL at 05:47

## 2018-11-10 RX ADMIN — QUETIAPINE FUMARATE SCH MG: 100 TABLET ORAL at 05:47

## 2018-11-10 RX ADMIN — TOPIRAMATE SCH MG: 25 TABLET, FILM COATED ORAL at 11:10

## 2018-11-10 RX ADMIN — PANTOPRAZOLE SODIUM SCH MG: 40 TABLET, DELAYED RELEASE ORAL at 11:09

## 2018-11-10 RX ADMIN — ATENOLOL SCH MG: 25 TABLET ORAL at 11:09

## 2018-11-10 RX ADMIN — RIVAROXABAN SCH MG: 20 TABLET, FILM COATED ORAL at 17:38

## 2018-11-10 RX ADMIN — GABAPENTIN SCH MG: 300 CAPSULE ORAL at 05:47

## 2018-11-10 RX ADMIN — FUROSEMIDE SCH MG: 40 TABLET ORAL at 11:09

## 2018-11-10 RX ADMIN — GABAPENTIN SCH MG: 300 CAPSULE ORAL at 14:26

## 2018-11-10 RX ADMIN — QUETIAPINE FUMARATE SCH MG: 100 TABLET ORAL at 14:28

## 2018-11-10 RX ADMIN — TOPIRAMATE SCH MG: 25 TABLET, FILM COATED ORAL at 14:27

## 2018-11-10 RX ADMIN — QUETIAPINE FUMARATE SCH MG: 100 TABLET ORAL at 11:12

## 2018-11-10 NOTE — DS
Physical Examination


Vital Signs: 


 Vital Signs











Temperature  98.2 F   11/10/18 01:09


 


Pulse Rate  74   11/10/18 01:09


 


Respiratory Rate  18   11/10/18 01:09


 


Blood Pressure  115/75   11/10/18 01:09


 


O2 Sat by Pulse Oximetry (%)  98   11/09/18 21:00











Constitutional: Yes: No Distress, Obese


Eyes: Yes: EOM Intact


HENT: Yes: Normocephalic


Neck: Yes: Trachea Midline


Cardiovascular: Yes: Regular Rate and Rhythm


Respiratory: Yes: CTA Bilaterally


Gastrointestinal: Yes: Normal Bowel Sounds, Soft


Edema: No


Peripheral Pulses WNL: Yes


Integumentary: Yes: WNL


Neurological: Yes: WNL


Labs: 


 CBC, BMP





 11/08/18 19:57 





 11/08/18 22:00 











Discharge Summary


Reason For Visit: SHORTNESS OF BREATH,CHEST PAIN


Current Active Problems





Chest pain (Acute)


Coronary artery disease (Acute)


Diastolic dysfunction (Acute)


Obesity (BMI 30-39.9) (Acute)








Hospital Course: 





admitted for chest pain, serial cardiac enzymes were negative, echo reviewed 

unremarkable.


pain resolved, vitals and physical exam is normal.


seen by cardiology.


medically stable to [facility] home.


pt has appointment for stress test next week as outpt.


Condition: Fair





- Instructions


Referrals: 


Jose Mckeon MD [Staff Physician] - 


Disposition: HOME





- Home Medications


Comprehensive Discharge Medication List: 


Ambulatory Orders





Diazepam [Valium -] 10 mg PO TID PRN 07/11/13 


Sumatriptan [Imitrex] 100 mg PO DAILY PRN 07/11/13 


Atorvastatin Ca [Lipitor] 80 mg PO DAILY 10/06/17 


Omeprazole 40 mg PO DAILY 10/06/17 


Quetiapine Fumarate [Seroquel] 100 mg PO TID 10/06/17 


Zolpidem Tartrate [Ambien] 10 mg PO HS PRN 10/06/17 


Atenolol [Tenormin -] 25 mg PO BID #60 tablet 05/15/18 


Furosemide [Lasix -] 40 mg PO DAILY #30 tablet 05/15/18 


Gabapentin 600 mg PO TID #90 tablet 05/15/18 


Orphenadrine Citrate [Orphenadrine Citrate ER] 100 mg PO BID 07/23/18 


Topiramate [Topiramate ER] 50 mg PO TID 07/23/18 


Oxycodone HCl/Acetaminophen [Percocet  mg Tablet] 1 each PO TID MDD 2 11/ 08/18 


Rivaroxaban [Xarelto -] 20 mg PO DAILY 11/08/18 


Topiramate [Topamax -] 50 mg PO TID  tablet 11/10/18

## 2018-11-10 NOTE — PN
Progress Note, Physician


Chief Complaint: 





Events noted


Not in distress except for neck and back pain


History of Present Illness: 





Patient was seen and examined. Awake and alert. Chart was reviewed


Denies chest pain, SOB or palpitations





- Current Medication List


Current Medications: 


Active Medications





Acetaminophen (Tylenol -)  325 mg PO Q8H PRN


   PRN Reason: PAIN LEVEL 7 - 10


   Last Admin: 11/09/18 17:27 Dose:  325 mg


Atenolol (Tenormin -)  25 mg PO BID [facility]


   Last Admin: 11/09/18 23:07 Dose:  25 mg


Atorvastatin Calcium (Lipitor -)  80 mg PO HS [facility]


   Last Admin: 11/09/18 23:05 Dose:  80 mg


Diazepam (Valium -)  10 mg PO TID PRN


   PRN Reason: MUSCLE SPASMS


   Last Admin: 11/10/18 08:04 Dose:  10 mg


Furosemide (Lasix -)  40 mg PO DAILY [facility]


   Last Admin: 11/09/18 11:32 Dose:  40 mg


Gabapentin (Neurontin -)  600 mg PO TID [facility]


   Last Admin: 11/10/18 05:47 Dose:  600 mg


Non-Formulary Medication (Orphenadrine Citrate [Orphenadrine Citrate Er])  100 

mg PO BID [facility]


Oxycodone HCl (Roxicodone -)  10 mg PO Q8H PRN


   PRN Reason: PAIN LEVEL 7 - 10


   Last Admin: 11/10/18 08:02 Dose:  10 mg


Pantoprazole Sodium (Protonix -)  40 mg PO DAILY [facility]


   Last Admin: 11/09/18 11:32 Dose:  40 mg


Quetiapine Fumarate (Seroquel -)  100 mg PO TID [facility]


   Last Admin: 11/10/18 05:47 Dose:  100 mg


Rivaroxaban (Xarelto -)  20 mg PO DAILY@1800 [facility]


   Last Admin: 11/09/18 17:26 Dose:  20 mg


Topiramate (Topamax -)  50 mg PO TID [facility]


   Last Admin: 11/10/18 05:47 Dose:  50 mg


Zolpidem Tartrate (Ambien -)  10 mg PO HS PRN


   PRN Reason: INSOMNIA


   Last Admin: 11/09/18 23:08 Dose:  10 mg











- Objective


Vital Signs: 


 Vital Signs











Temperature  97.9 F   11/10/18 06:00


 


Pulse Rate  84   11/10/18 06:00


 


Respiratory Rate  18   11/10/18 06:00


 


Blood Pressure  119/88   11/10/18 06:00


 


O2 Sat by Pulse Oximetry (%)  98   11/09/18 21:00











Eyes: Yes: PERRL


HENT: Yes: Atraumatic


Neck: Yes: Supple


Cardiovascular: Yes: Regular Rate and Rhythm, S1, S2


Respiratory: Yes: CTA Bilaterally


Gastrointestinal: Yes: Normal Bowel Sounds, Soft.  No: Tenderness


Edema: No





Problem List





- Problems


(1) Chest pain


Code(s): R07.9 - CHEST PAIN, UNSPECIFIED   


Qualifiers: 


   Chest pain type: unspecified   Qualified Code(s): R07.9 - Chest pain, 

unspecified   





(2) Coronary artery disease


Code(s): I25.10 - ATHSCL HEART DISEASE OF NATIVE CORONARY ARTERY W/O ANG PCTRS 

  


Qualifiers: 


   Coronary Disease-Associated Artery/Lesion type: native artery   Native vs. 

transplanted heart: native heart   Associated angina: without angina   

Qualified Code(s): I25.10 - Atherosclerotic heart disease of native coronary 

artery without angina pectoris   





(3) Diastolic dysfunction


Code(s): I51.9 - HEART DISEASE, UNSPECIFIED   





(4) Pulmonary embolism


Code(s): I26.99 - OTHER PULMONARY EMBOLISM WITHOUT ACUTE COR PULMONALE   


Qualifiers: 


   Chronicity: unspecified 





(5) Hyperlipidemia


Code(s): E78.5 - HYPERLIPIDEMIA, UNSPECIFIED   


Qualifiers: 


   Hyperlipidemia type: pure hypercholesterolemia   Qualified Code(s): E78.00 - 

Pure hypercholesterolemia, unspecified; E78.0 - Pure hypercholesterolemia   





(6) Hypertension


Code(s): I10 - ESSENTIAL (PRIMARY) HYPERTENSION   


Qualifiers: 


   Hypertension type: essential hypertension   Qualified Code(s): I10 - 

Essential (primary) hypertension   





Assessment/Plan





1. Atypical chest pain


2. Pulmonary embolism on NOAC


3. Non-obstructive CAD, angina pectoris


4. Hypertension


5. Hypercholesterolemia


6. Diastolic dysfunction


7. Bipolar/depression


8. Degenerative joint disease and injuries





PLAN:


1. Patient has appointment for 2 day stress protocol next Tuesday in our office


2. Continue Lipitor 80 QD, Atenolol 25 BID and Xarelto 20 QD


3. Discharge home and follow up with Dr. Jose Mckeon in the office





Brigido Martin MD

## 2018-11-25 ENCOUNTER — HOSPITAL ENCOUNTER (EMERGENCY)
Dept: HOSPITAL 74 - JER | Age: 53
LOS: 1 days | Discharge: HOME | End: 2018-11-26
Payer: COMMERCIAL

## 2018-11-25 VITALS — HEART RATE: 87 BPM | DIASTOLIC BLOOD PRESSURE: 62 MMHG | SYSTOLIC BLOOD PRESSURE: 113 MMHG | TEMPERATURE: 98.7 F

## 2018-11-25 VITALS — BODY MASS INDEX: 39.3 KG/M2

## 2018-11-25 DIAGNOSIS — Z88.8: ICD-10-CM

## 2018-11-25 DIAGNOSIS — R60.0: Primary | ICD-10-CM

## 2018-11-25 DIAGNOSIS — F31.9: ICD-10-CM

## 2018-11-25 DIAGNOSIS — E78.5: ICD-10-CM

## 2018-11-25 DIAGNOSIS — I11.0: ICD-10-CM

## 2018-11-25 DIAGNOSIS — I50.9: ICD-10-CM

## 2018-11-25 DIAGNOSIS — G89.29: ICD-10-CM

## 2018-11-25 DIAGNOSIS — Z86.718: ICD-10-CM

## 2018-11-25 DIAGNOSIS — E66.9: ICD-10-CM

## 2018-11-25 DIAGNOSIS — Z86.711: ICD-10-CM

## 2018-11-25 DIAGNOSIS — Z79.01: ICD-10-CM

## 2018-11-25 DIAGNOSIS — M54.5: ICD-10-CM

## 2018-11-25 DIAGNOSIS — M19.90: ICD-10-CM

## 2018-11-25 LAB
ALBUMIN SERPL-MCNC: 3.8 G/DL (ref 3.4–5)
ALP SERPL-CCNC: 128 U/L (ref 45–117)
ALT SERPL-CCNC: 45 U/L (ref 13–61)
ANION GAP SERPL CALC-SCNC: 8 MMOL/L (ref 8–16)
AST SERPL-CCNC: 54 U/L (ref 15–37)
BASOPHILS # BLD: 1.3 % (ref 0–2)
BILIRUB SERPL-MCNC: 0.4 MG/DL (ref 0.2–1)
BNP SERPL-MCNC: 23.1 PG/ML (ref 5–125)
BUN SERPL-MCNC: 18 MG/DL (ref 7–18)
CALCIUM SERPL-MCNC: 8.1 MG/DL (ref 8.5–10.1)
CHLORIDE SERPL-SCNC: 94 MMOL/L (ref 98–107)
CO2 SERPL-SCNC: 36 MMOL/L (ref 21–32)
CREAT SERPL-MCNC: 1.3 MG/DL (ref 0.55–1.3)
DEPRECATED RDW RBC AUTO: 14.5 % (ref 11.9–15.9)
EOSINOPHIL # BLD: 3.1 % (ref 0–4.5)
GLUCOSE SERPL-MCNC: 141 MG/DL (ref 74–106)
HCT VFR BLD CALC: 42.6 % (ref 35.4–49)
HGB BLD-MCNC: 14.9 GM/DL (ref 11.7–16.9)
INR BLD: 2.25 (ref 0.83–1.09)
LYMPHOCYTES # BLD: 31.7 % (ref 8–40)
MAGNESIUM SERPL-MCNC: 2.2 MG/DL (ref 1.8–2.4)
MCH RBC QN AUTO: 31.3 PG (ref 25.7–33.7)
MCHC RBC AUTO-ENTMCNC: 35 G/DL (ref 32–35.9)
MCV RBC: 89.7 FL (ref 80–96)
MONOCYTES # BLD AUTO: 10 % (ref 3.8–10.2)
NEUTROPHILS # BLD: 53.9 % (ref 42.8–82.8)
PHOSPHATE SERPL-MCNC: 3.7 MG/DL (ref 2.5–4.9)
PLATELET # BLD AUTO: 208 K/MM3 (ref 134–434)
PMV BLD: 9 FL (ref 7.5–11.1)
POTASSIUM SERPLBLD-SCNC: 3.3 MMOL/L (ref 3.5–5.1)
PROT SERPL-MCNC: 7 G/DL (ref 6.4–8.2)
PT PNL PPP: 26.8 SEC (ref 9.7–13)
RBC # BLD AUTO: 4.75 M/MM3 (ref 4–5.6)
SODIUM SERPL-SCNC: 138 MMOL/L (ref 136–145)
WBC # BLD AUTO: 7.5 K/MM3 (ref 4–10)

## 2018-11-25 NOTE — PDOC
History of Present Illness





- General


Chief Complaint: Shortness of Breath


Stated Complaint: SWELLING TO FEET, KNEE PAIN


Time Seen by Provider: 11/25/18 21:16


History Source: Patient


Exam Limitations: No Limitations





- History of Present Illness


Initial Comments: 


This is a 53 YOM with h/o PE (5/2018 now on Xarelto), LLE DVT (in 2008 after 

knee surgery), HTN, HLD, obesity, bipolar disorder, depression, chronic low 

back pain, and osteoarthritis who p/w increased leg swelling, SOB, cough 

productive of frothy sputum/saliva, and feeling of liquid in his lungs and 

heart whenever he lays down flat. He notes left sided non-radiating chest 

pressure ongoing for the past few weeks. Otherwise denies any recent f/c/n/v/d/c

, dysuria, abdominal pain, or other symptoms. He went up on his Lasix dose 

yesterday per his cardiologist's request (from 40mg/day to 40 mg bid). 

Cardiologists Dr. Martin and Mike here at [facility] (states he has seen both of them). 

PCP is Osiel Barakat.





Past History





- Past Medical History


Allergies/Adverse Reactions: 


 Allergies











Allergy/AdvReac Type Severity Reaction Status Date / Time


 


ketorolac [From Toradol] Allergy Severe Difficulty Verified 11/25/18 21:12





   Breathing  


 


tramadol Allergy Severe Difficulty Verified 11/25/18 21:12





   Breathing  


 


trazodone Allergy Severe Difficulty Verified 11/25/18 21:12





   Breathing  


 


morphine Allergy  Rash Verified 11/25/18 21:12


 


meperidine HCl [From Demerol] AdvReac   Verified 11/25/18 21:12











Home Medications: 


Ambulatory Orders





Diazepam [Valium -] 10 mg PO TID PRN 07/11/13 


Sumatriptan [Imitrex] 100 mg PO DAILY PRN 07/11/13 


Atorvastatin Ca [Lipitor] 80 mg PO DAILY 10/06/17 


Omeprazole 40 mg PO DAILY 10/06/17 


Quetiapine Fumarate [Seroquel] 100 mg PO TID 10/06/17 


Zolpidem Tartrate [Ambien] 10 mg PO HS PRN 10/06/17 


Atenolol [Tenormin -] 25 mg PO BID #60 tablet 05/15/18 


Furosemide [Lasix -] 40 mg PO DAILY #30 tablet 05/15/18 


Gabapentin 600 mg PO TID #90 tablet 05/15/18 


Orphenadrine Citrate [Orphenadrine Citrate ER] 100 mg PO BID 07/23/18 


Topiramate [Topiramate ER] 50 mg PO TID 07/23/18 


Oxycodone HCl/Acetaminophen [Percocet  mg Tablet] 1 each PO TID MDD 2 11/ 08/18 


Rivaroxaban [Xarelto -] 20 mg PO DAILY 11/08/18 


Topiramate [Topamax -] 50 mg PO TID  tablet 11/10/18 








Cardiac Disorders: Yes (RAPID HEARTBEAT)


COPD: No


GI Disorders: Yes (GERD)


HTN: Yes


Hypercholesterolemia: Yes


Seizures: Yes (BIPOLAR)





- Surgical History


Abdominal Surgery: Yes (bilat hernia)


Appendectomy: Yes





- Immunization History


Immunization Up to Date: No





- Suicide/Smoking/Psychosocial Hx


Smoking Status: No


Smoking History: Never smoked


Have you smoked in the past 12 months: No


Number of Cigarettes Smoked Daily: 0


Hx Alcohol Use: No


Drug/Substance Use Hx: No


Substance Use Type: None


Hx Substance Use Treatment: No





**Review of Systems





- Review of Systems


Able to Perform ROS?: Yes


Comments:: 


GEN: weight gain, no fever, chills, malaise, or generalized weakness


HEENT: no ear pain, sore throat, vision change, or eye pain


CV: edema, chest pain, no palpitations, lightheadedness, or syncope


RESP: cough, SOB, no wheezing


GI: no abdominal pain, nausea, vomiting, diarrhea, constipation, or white/black/

bloody stool


: no dysuria, hematuria, incontinence, retention, bleeding, or discharge 


MSK: no neck/back pain, muscle weakness/pain, or joint swelling/pain


NEURO: no headache, seizure, vertigo, numbness, tingling, or focal weakness


PSYCH: no substance use, no behavior change


SKIN: no jaundice, no rash


ROS otherwise negative except as noted in HPI





*Physical Exam





- Vital Signs


 Last Vital Signs











Temp Pulse Resp BP Pulse Ox


 


 98.7 F   87   20   113/62    


 


 11/25/18 21:12  11/25/18 21:12  11/25/18 21:12  11/25/18 21:12   











GENERAL: well-appearing, A/Ox4, no distress, answers questions appropriately, 

talkative, appears comfortable, speaking full sentences


HEENT: PERRLA, EOMI, moist mucous membranes


NECK/BACK: no midline ttp, no spinal stepoff or deformity, no hematoma, full ROM

, neck supple


CARDIOVASCULAR: regular rate/rhythm, normal S1S2, no MGR, strong peripheral 

pulses, capillary refill <2 seconds, extremities wwp, BLE 2+ pitting edema


LUNGS/RESPIRATORY: no respiratory distress, CTAB


GI/ABDOMEN: symmetric side-to-side, normoactive BS, soft, no ttp, no midline 

pulsatile masses


: no CVA tenderness


EXTREMITIES:  no muscle atrophy, no acute deformity, pitting edema BLE as noted 

in cardiac section


SKIN: warm and dry, no pallor, no jaundice, no rash, no bruising, no skin 

breakdown, no cuts, no lesions


NEUROLOGICAL: GCS 15, CN II-XII grossly intact, 5/5 strength proximally and 

distally, no facial droop





ED Treatment Course





- LABORATORY


CBC & Chemistry Diagram: 


 11/25/18 22:47





 11/25/18 22:47





Medical Decision Making





- Medical Decision Making


Pt with h/o CHF who p/w SOB, cough, orthopnea same as their prior CHF.





 Initial Vital Signs











Temp Pulse Resp BP


 


 98.7 F   87   20   113/62 


 


 11/25/18 21:12  11/25/18 21:12  11/25/18 21:12  11/25/18 21:12











Exam: As noted in Physical Exam section.


DDX IBNLT: CHF, pulmonary edema, COPD, asthma, other lung disease, PNA/

bronchitis, anemia, ACS, pericarditis, tamponade, AD, PE, PTX, allergic reaction

, malignancy (e.g. causing pericardial effusion or vascular shunt), other 

infection, pulmonary HTN, etc.


W/U ordered: CBCD CMP Mg Phos Troponin CK CKMB BNP EKG CXR


TX ordered: None initially





EKG: Reviewed; results as noted in ECG Review section.


CXR: No acute cardiopulmonary processes





 Laboratory Tests











  11/25/18 11/25/18 11/25/18





  22:47 22:47 22:47


 


WBC  7.5  


 


RBC  4.75  


 


Hgb  14.9  


 


Hct  42.6  


 


MCV  89.7  


 


MCH  31.3  


 


MCHC  35.0  


 


RDW  14.5  


 


Plt Count  208  


 


MPV  9.0  


 


Absolute Neuts (auto)  4.0  


 


Neutrophils %  53.9  


 


Lymphocytes %  31.7  


 


Monocytes %  10.0  


 


Eosinophils %  3.1  D  


 


Basophils %  1.3  D  


 


Nucleated RBC %  0  


 


PT with INR   26.80 H 


 


INR   2.25 H 


 


Sodium    138


 


Potassium    3.3 L


 


Chloride    94 L


 


Carbon Dioxide    36 H


 


Anion Gap    8


 


BUN    18


 


Creatinine    1.3


 


Creat Clearance w eGFR    57.53


 


Random Glucose    141 H


 


Calcium    8.1 L


 


Phosphorus    3.7


 


Magnesium    2.2


 


Total Bilirubin    0.4


 


AST    54 H


 


ALT    45


 


Alkaline Phosphatase    128 H


 


Creatine Kinase    464 H


 


Creatine Kinase Index    0.4


 


CK-MB (CK-2)    2.0


 


Troponin I    < 0.02


 


B-Natriuretic Peptide    23.1


 


Total Protein    7.0


 


Albumin    3.8














Reassessment: Patient states feels well, comfortable going home.


I offer him Motrin before going home and he obliges.





11/26/18 00:31


Workup is not concerning for emergency-level pathology at this time.


The Pt is appropriate for discharge with close outpatient follow up.


He is comfortable with this plan and will follow up with his primary care 

provider in 1-3 days.


He will call both his PCP and his cardiologist in the morning (Monday, today).


He will speak with his cardiologist about having an appointment before his 

previously scheduled 11/6/18 appointment.


Specific return precautions are discussed and they will come back to the ER if 

necessary.





*DC/Admit/Observation/Transfer


Diagnosis at time of Disposition: 


 Leg swelling








- Discharge Dispostion


Disposition: HOME


Condition at time of disposition: Stable


Decision to Admit order: No





- Referrals


Referrals: 


Osiel Barakat MD [Primary Care Provider] - 





- Patient Instructions


Additional Instructions: 


You were seen in the ER for leg swelling and unchanged chronic chest pain. We 

did lab work, an electrocardiogram, and a chest x-ray, and we did not find any 

concerning abnormalities. Your lab work and exam suggest that you are not 

having heart failure exacerbation right now. After our assessment, we do not 

believe you are having a medical emergency at this time, and we believe you are 

safe to go home. Take all of your regular medicines at home, including the 40 

mg or Lasix twice a day as instructed by your cardiologist. Take over the 

counter pain medications for your pain, as instructed on the medication label. 

Please follow up with your primary care provider on Monday. Call their clinic 

as soon as possible, tell them you were seen in the ER, and tell them you need 

an appointment. Call your cardiologist on Monday morning too and inform them 

that you were seen in the ER and need a follow up appointment ASAP. If you have 

any new or worsening symptoms, especially worsening chest pain, jaw pain, 

shoulder/arm pain, shortness of breath, sweats, nausea, loss of consciousness, 

palpitations, or other symptoms, please come back to the ER at any time (24 

hours a day). If you are having severe or life threatening symptoms, or 

symptoms that make it unsafe to drive or have someone drive you, please call 

[phone].





- Post Discharge Activity

## 2018-11-25 NOTE — PDOC
Attending Attestation





- HPI


HPI: 





11/25/18 22:55


The patient is a 53 year old male with a past medical history of angina, PE, HTN

, HLD, obesity, and bipolar disorder here today for evaluation of shortness of 

breath and chest pain. The patient was seen here on 11/08/18 for similar 

symptoms and was admitted. The patient reports feeling better after he was 

discharged but began to feel his shortness of breath and chest pain again 

yesterday. He reports that his shortness of breath is worse with exercise. The 

patient also reports lower extremity edema for which he called his cardiologist

, Dr. Elise and was told to come to [place]. 





Patient denies headache, lightheadedness. Denies fever, chills. Denies nausea, 

vomiting, diarrhea, abdominal pain. Denies urinary symptoms. Denies neurologic 

symptoms. 





Allergies: Ketorolac, tramadol, trazodone, morphine, meperidine HCL


Social history: denies tobacco use


PCP: Osiel Mcneil


Cardiologist: Juli Hughes








<Anthony Farah - Last Filed: 11/25/18 23:23>





- Resident


Resident Name: Kristin Graves





- Physicial Exam


PE: 





11/26/18 00:23


Agree with resident exam.  PAtient is alert and oriented and in no acute 

distress.  Lungs are clear.  + trace edema to mid calf bilaterally.  





- Medical Decision Making





11/26/18 00:24


PT presents to the ED complaining of increasing shortness of breath and leg 

swelling despite increasing his dose of lasix.  Patient is well appearing in 

the ED and in no respiratory distress, with clear CXR and only minimal edema. 

BNP is only 23.  Pt has outpatient follow up with cardiology.  Will consider 

discharge home with close cardiology follow up. 





<Caro Khan - Last Filed: 11/26/18 00:27>

## 2018-11-26 NOTE — EKG
Test Reason : 

Blood Pressure : ***/*** mmHG

Vent. Rate : 077 BPM     Atrial Rate : 077 BPM

   P-R Int : 182 ms          QRS Dur : 090 ms

    QT Int : 404 ms       P-R-T Axes : 050 -11 027 degrees

   QTc Int : 457 ms

 

NORMAL SINUS RHYTHM

NORMAL ECG

WHEN COMPARED WITH ECG OF 08-NOV-2018 19:09,

NO SIGNIFICANT CHANGE WAS FOUND

Confirmed by AZIZA LARSON MD ([id number]) on 11/26/2018 10:55:34 AM

 

Referred By:             Confirmed By:AZIZA LARSON MD

## 2018-11-28 ENCOUNTER — HOSPITAL ENCOUNTER (EMERGENCY)
Dept: HOSPITAL 74 - JER | Age: 53
Discharge: HOME | End: 2018-11-28
Payer: COMMERCIAL

## 2018-11-28 VITALS — SYSTOLIC BLOOD PRESSURE: 145 MMHG | TEMPERATURE: 97.9 F | DIASTOLIC BLOOD PRESSURE: 78 MMHG | HEART RATE: 87 BPM

## 2018-11-28 VITALS — BODY MASS INDEX: 39.3 KG/M2

## 2018-11-28 DIAGNOSIS — M79.89: Primary | ICD-10-CM

## 2018-11-28 DIAGNOSIS — F31.9: ICD-10-CM

## 2018-11-28 DIAGNOSIS — R07.9: ICD-10-CM

## 2018-11-28 DIAGNOSIS — R00.2: ICD-10-CM

## 2018-11-28 DIAGNOSIS — K21.9: ICD-10-CM

## 2018-11-28 DIAGNOSIS — I10: ICD-10-CM

## 2018-11-28 LAB
ALBUMIN SERPL-MCNC: 3.4 G/DL (ref 3.4–5)
ALP SERPL-CCNC: 125 U/L (ref 45–117)
ALT SERPL-CCNC: 34 U/L (ref 13–61)
ANION GAP SERPL CALC-SCNC: 11 MMOL/L (ref 8–16)
APPEARANCE UR: CLEAR
APTT BLD: 31.4 SECONDS (ref 25.2–36.5)
AST SERPL-CCNC: 31 U/L (ref 15–37)
BASOPHILS # BLD: 0.9 % (ref 0–2)
BILIRUB SERPL-MCNC: 0.2 MG/DL (ref 0.2–1)
BILIRUB UR STRIP.AUTO-MCNC: NEGATIVE MG/DL
BUN SERPL-MCNC: 16 MG/DL (ref 7–18)
CALCIUM SERPL-MCNC: 7.9 MG/DL (ref 8.5–10.1)
CHLORIDE SERPL-SCNC: 102 MMOL/L (ref 98–107)
CO2 SERPL-SCNC: 29 MMOL/L (ref 21–32)
COLOR UR: (no result)
CREAT SERPL-MCNC: 1.2 MG/DL (ref 0.55–1.3)
DEPRECATED RDW RBC AUTO: 14.5 % (ref 11.9–15.9)
EOSINOPHIL # BLD: 2.7 % (ref 0–4.5)
GLUCOSE SERPL-MCNC: 144 MG/DL (ref 74–106)
HCT VFR BLD CALC: 40.1 % (ref 35.4–49)
HGB BLD-MCNC: 12.9 GM/DL (ref 11.7–16.9)
INR BLD: 1.14 (ref 0.83–1.09)
KETONES UR QL STRIP: NEGATIVE
LEUKOCYTE ESTERASE UR QL STRIP.AUTO: NEGATIVE
LYMPHOCYTES # BLD: 31 % (ref 8–40)
MCH RBC QN AUTO: 29.4 PG (ref 25.7–33.7)
MCHC RBC AUTO-ENTMCNC: 32.2 G/DL (ref 32–35.9)
MCV RBC: 91.4 FL (ref 80–96)
MONOCYTES # BLD AUTO: 9.4 % (ref 3.8–10.2)
NEUTROPHILS # BLD: 56 % (ref 42.8–82.8)
NITRITE UR QL STRIP: NEGATIVE
PH UR: 6 [PH] (ref 5–8)
PLATELET # BLD AUTO: 171 K/MM3 (ref 134–434)
PMV BLD: 8.5 FL (ref 7.5–11.1)
POTASSIUM SERPLBLD-SCNC: 3.1 MMOL/L (ref 3.5–5.1)
PROT SERPL-MCNC: 6.2 G/DL (ref 6.4–8.2)
PROT UR QL STRIP: NEGATIVE
PROT UR QL STRIP: NEGATIVE
PT PNL PPP: 13.5 SEC (ref 9.7–13)
RBC # BLD AUTO: 4.39 M/MM3 (ref 4–5.6)
SODIUM SERPL-SCNC: 141 MMOL/L (ref 136–145)
SP GR UR: 1.01 (ref 1.01–1.03)
UROBILINOGEN UR STRIP-MCNC: NEGATIVE MG/DL (ref 0.2–1)
WBC # BLD AUTO: 7.7 K/MM3 (ref 4–10)

## 2018-11-28 NOTE — PDOC
History of Present Illness





- General


Chief Complaint: Edema


Stated Complaint: CHEST PAIN-REF BY DOC.


Time Seen by Provider: 11/28/18 13:47


History Source: Patient


Exam Limitations: No Limitations





- History of Present Illness


Initial Comments: 





11/28/18 14:15


54 yo male pmh of anxiety, bipolar, angina, PE (on Xarelto) HTN, HLD and morbid 

obesity presents to the ED for Chest Pain and bilateral leg edema. Pt states 

his CP is of the same intensity and quality over the past 3-4 months described 

as sharp point tenderness lateral to the sternum around the 4th rib, 

intermittent, non radiating and worse on exertion. Of note Pt states he had a 

Nuclear stress test 1 week ago by Dr. Mckeon and has a follow up appointment on 

Dec 6th for results. Also spoke with the Cardiologist after the stress test 

regarding worsening bilateral lower limb edema and had his furosemide increased 

from 40 to 80 mg a day without improvement of swelling. Pt does admit to waking 

up in the middle of the night SOB. Admits to bilateral calf tenderness as well. 

Last CTA in the system  11/09/2018, negative for PE. Denies F/C/N/V, changes in 

urinary or bowel habits, abdominal pain or neurological symptoms. 




















Past History





- Past Medical History


Allergies/Adverse Reactions: 


 Allergies











Allergy/AdvReac Type Severity Reaction Status Date / Time


 


ketorolac [From Toradol] Allergy Severe Difficulty Verified 11/28/18 12:37





   Breathing  


 


tramadol Allergy Severe Difficulty Verified 11/28/18 12:37





   Breathing  


 


trazodone Allergy Severe Difficulty Verified 11/28/18 12:37





   Breathing  


 


morphine Allergy  Rash Verified 11/28/18 12:37


 


meperidine HCl [From Demerol] AdvReac   Verified 11/28/18 12:37











Home Medications: 


Ambulatory Orders





Diazepam [Valium -] 10 mg PO TID PRN 07/11/13 


Sumatriptan [Imitrex] 100 mg PO DAILY PRN 07/11/13 


Atorvastatin Ca [Lipitor] 80 mg PO DAILY 10/06/17 


Omeprazole 40 mg PO DAILY 10/06/17 


Quetiapine Fumarate [Seroquel] 100 mg PO TID 10/06/17 


Zolpidem Tartrate [Ambien] 10 mg PO HS PRN 10/06/17 


Atenolol [Tenormin -] 25 mg PO BID #60 tablet 05/15/18 


Furosemide [Lasix -] 40 mg PO DAILY #30 tablet 05/15/18 


Gabapentin 600 mg PO TID #90 tablet 05/15/18 


Orphenadrine Citrate [Orphenadrine Citrate ER] 100 mg PO BID 07/23/18 


Topiramate [Topiramate ER] 50 mg PO TID 07/23/18 


Oxycodone HCl/Acetaminophen [Percocet  mg Tablet] 1 each PO TID MDD 2 11/ 08/18 


Rivaroxaban [Xarelto] 20 mg PO DAILY 11/08/18 


Topiramate [Topamax -] 50 mg PO TID  tablet 11/10/18 








Cardiac Disorders: Yes (RAPID HEARTBEAT)


COPD: No


GI Disorders: Yes (GERD)


HTN: Yes


Hypercholesterolemia: Yes


Seizures: Yes (BIPOLAR)





- Surgical History


Abdominal Surgery: Yes (bilat hernia)


Appendectomy: Yes





- Immunization History


Immunization Up to Date: No





- Suicide/Smoking/Psychosocial Hx


Smoking Status: No


Smoking History: Never smoked


Have you smoked in the past 12 months: No


Number of Cigarettes Smoked Daily: 0


Hx Alcohol Use: No


Drug/Substance Use Hx: No


Substance Use Type: None


Hx Substance Use Treatment: No





**Review of Systems





- Review of Systems


Constitutional: No: Chills, Fever


Respiratory: Yes: Orthopnea.  No: Shortness of Breath, Wheezing


Cardiac (ROS): Yes: Chest Pain (chronic), Edema (chronic ).  No: Irregular 

Heart Rate


ABD/GI: No: Constipated, Diarrhea, Nausea, Vomiting


: No: Burning, Dysuria, Flank Pain


Musculoskeletal: No: Back Pain





*Physical Exam





- Vital Signs


 Last Vital Signs











Temp Pulse Resp BP Pulse Ox


 


 98.0 F   85   18   105/66   96 


 


 11/28/18 12:34  11/28/18 12:34  11/28/18 12:34  11/28/18 12:34  11/28/18 12:34














- Physical Exam


General Appearance: Yes: Nourished, Appropriately Dressed.  No: Apparent 

Distress


HEENT: positive: EOMI


Respiratory/Chest: positive: Lungs Clear, Normal Breath Sounds.  negative: 

Accessory Muscle Use, Crackles, Rales, Rhonchi, Wheezing


Cardiovascular: positive: Regular Rhythm, Regular Rate, S1, S2, Edema (2+ 

pitting).  negative: JVD, Murmur


Vascular Pulses: Dorsalis-Pedis (R): 4+, Doralis-Pedis (L): 4+


Gastrointestinal/Abdominal: positive: Normal Bowel Sounds, Soft.  negative: 

Protuberent, Distended, Guarding, Rebound, Tenderness


Musculoskeletal: positive: Normal Inspection (ambulating )


Extremity: positive: Normal Capillary Refill


Integumentary: positive: Normal Color, Dry, Warm


Neurologic: positive: Fully Oriented, Alert, Normal Mood/Affect, Normal Response





Moderate Sedation





- Procedure Monitoring


Vital Signs: 


Procedure Monitoring Vital Signs











Temperature  98.0 F   11/28/18 12:34


 


Pulse Rate  85   11/28/18 12:34


 


Respiratory Rate  18   11/28/18 12:34


 


Blood Pressure  105/66   11/28/18 12:34


 


O2 Sat by Pulse Oximetry (%)  96   11/28/18 12:34











**Heart Score/ECG Review





- History


History: Slightly suspicious





- Age


Age: 45-65





- Risk Factors


Risk Factors Heart Score: Yes Hx Hypercholesterolemia, Yes Hx Hypertension, Yes 

Hx Obesity





- Troponin


Troponin: </= normal limit





ED Treatment Course





- LABORATORY


CBC & Chemistry Diagram: 


 11/28/18 15:30





 11/28/18 15:30





- RADIOLOGY


Radiology Studies Ordered: 














 Category Date Time Status


 


 CHEST PA & LAT [RAD] Stat Radiology  11/28/18 14:14 Ordered














Medical Decision Making





- Medical Decision Making


54 yo male very well known to the ED presents for his chronic CP and worsening 

bilateral leg edema worsening after increasing dosage from 40mg to 80mg per day 

for the past 5 days. 








11/28/18 14:54


Pt states he took valium and seroquel today for anxiety because he knows every 

time he comes to the ed he becomes very anxious.





EKG normal sinus no ST changes 





DVT study negative 


Blood work WNL but Potassium low. 





Chest X ray not needed on todays evaluation due to recent imaging, Nuclear 

stress test, upcoming Cardio appointment.





11/28/18 16:01


Spoke with Dr. Mckeon who states the pts nuclear stress test showed no ischemia 

and EF was 65%. He would like the pt to decrease his Furosemide from 40 BID to 

40 mg daily since it is not helping with the swelling and would like him to ACE 

wrap is lower legs. Follow up appoint confirmed on Dec 6th with Dr. Mckeon and 

states pt is safe to discharge home.





DC home with PCP and Cardiology follow up with strict return precautions and 

low salt diet and increased potassium





Pt agrees and understands discharge plans  














*DC/Admit/Observation/Transfer


Diagnosis at time of Disposition: 


 Lower extremity edema





Chest pain


Qualifiers:


 Chest pain type: unspecified Qualified Code(s): R07.9 - Chest pain, unspecified








- Discharge Dispostion


Disposition: HOME


Condition at time of disposition: Stable


Decision to Admit order: No





- Referrals


Referrals: 


Osiel Barakat MD [Primary Care Provider] - 


Jose Mckeon MD [Staff Physician] - 





- Patient Instructions


Printed Discharge Instructions:  Low-Sodium Diet


Additional Instructions: 


Please follow up and schedule an appointment with your Primary Care Doctor and 

Cardiologist within the next 2 days. According to your Doctor, only take 40mg 

of Furosemide until your next visit, increase potassium in your diet and reduce 

salt intake which can be contributing to your swollen legs. Use ace wrap to 

compress legs and decrease swelling. Return to the emergency room for new or 

concerning symptoms including but not limited to: severe chest pain, shortness 

of breath, worsening leg edema with calf tenderness, sudden warmth and 

sweating. 





Thank you





- Post Discharge Activity Post-Sedation Assessment  Olivia Finch   10/27/2017  A pre-sedation re-evaluation was performed immediately prior to beginning the procedure.   Procedure: CLS,EGD  Preprocedure Dx: screen, FHx of gastric ca  Postprocedure Dx: polyps, nl EGD  Medications: Procedural sedation with Fentanyl, Versed  Complications: None  Estimated Blood Loss: <10cc  Specimens: were obtained    Hesham Mayfield

## 2018-11-28 NOTE — EKG
Test Reason : 

Blood Pressure : ***/*** mmHG

Vent. Rate : 072 BPM     Atrial Rate : 072 BPM

   P-R Int : 172 ms          QRS Dur : 092 ms

    QT Int : 404 ms       P-R-T Axes : 027 -15 027 degrees

   QTc Int : 442 ms

 

NORMAL SINUS RHYTHM

NORMAL ECG

WHEN COMPARED WITH ECG OF 25-NOV-2018 22:18,

NO SIGNIFICANT CHANGE WAS FOUND

Confirmed by CARL IRVING MD ([id number]) on 11/28/2018 3:51:40 PM

 

Referred By:             Confirmed By:CARL IRVING MD

## 2018-11-28 NOTE — PDOC
Attending Attestation





- HPI


HPI: 





11/28/18 16:21


53YOM, with a significant past medical history of anxiety, bipolar, angina, PE (

on Xarelto) HTN, HLD and morbid obesity, who presents to the emergency 

department with, chest pain and lower extremity edema.  Patients chest pain 

has been chronic over the was 3-4 months. Patient has a follow up appointment 12 /6 with Dr. Mckeon. 





Allergies: Ketorolac, tramadol, trazodone, morphine, meperidine HCL


Social history: denies tobacco use


PCP: Dr. Osiel Mcneil








- Physicial Exam


PE: 





11/28/18 16:21


NAD, well appearing, PERRL, EOMI, MMM, nl conjunctiva, anicteric; neck supple. 

lungs clear, RRR, abdomen soft nontender. MELÉNDEZ x4, no focal neuro deficits. 

normal color for ethnicity, WWP. +bilateral leg edema and calf tenderness.








<Sailaja Yan - Last Filed: 11/28/18 16:21>





- Resident


Resident Name: Sami Aviles





- ED Attending Attestation


I have performed the following: I have examined & evaluated the patient, The 

case was reviewed & discussed with the resident, I agree w/resident's findings 

& plan





- Medical Decision Making





11/28/18 17:12





ALLEN 52 yo male pmh of anxiety, bipolar, angina, PE (on Xarelto) HTN, HLD 

and morbid obesity presents to the ED for Chest Pain and bilateral leg edema.





Vital signs reviewed, wnl. 


Prior notes reviewed, including admissions, discharges and consultations. 

laboratory results and imaging reviewed, basic labs and lytes wnl, notable for 

low potassium repleted with PO 40 meq


UA_unremarkable


Cardiac panel_neg trop, bnp negative, doubt fluid overload or cardiomyopathy 

flareup


EKG normal sinus rhythm, no interval abnormalities, narrow QRS, ST and T wave 

segments and morphology normal. Nonspecific T wave abnormalities 


ED course: duplex negative for DVT bilaterally


Diuretic use likely causing low potassium, which was repleted


Called to his cardiologist, Dr. Mckeon - normal stress echo, EF 65%, which is 

reassuring, less likely anginal/cardiac.


Has been comfortable, tolerating PO intake, no falls. Elevate legs, avoid salty 

food intake or food precipitants and salt retention 





Dispo:  Pt to be discharged in stable condition. Patient and family made aware 

of impression and plan, return precautions discussed (including but not limited 

to worsening pain or symptoms), fevers, or signs of infection, chest pain, 

respiratory distress, inability to tolerate oral intake, dehydration, syncope, 

or neurologic changes). Follow up with PMD and/or specialist as recommended, 

follow up information provided, take medications as instructed for duration of 

time. continue with supportive care, avoid triggers and precipitants. All 

questions answered to patient's satisfaction and expressed understanding and 

comfort with this. 








<Neena Watson - Last Filed: 11/28/18 17:16>





**Heart Score/ECG Review





- ECG Impressions


Normal ECG: Yes


Comment:: 





11/28/18 16:07





EKG normal sinus rhythm, no interval abnormalities, narrow QRS, ST and T wave 

segments and morphology normal. Nonspecific T wave abnormalities 








<Neena Watson - Last Filed: 11/28/18 17:16>





Attestations





- Attestations





11/28/18 16:22





Documentation prepared by Sailaja Yan, acting as medical scribe for 

Neena Watson MD.





<Sailaja Yan - Last Filed: 11/28/18 16:21>





- Attestations


Physician Attestation: 





11/28/18 17:16





I, Neena Watson MD, attest that this document has been prepared under my 

direction and personally reviewed by me in its entirety.   I further attest, 

that it accurately reflects all work, treatment, procedures and medical decision

-making performed by me.  








<Neena Watson - Last Filed: 11/28/18 17:16>

## 2018-12-12 ENCOUNTER — HOSPITAL ENCOUNTER (EMERGENCY)
Dept: HOSPITAL 74 - JER | Age: 53
LOS: 1 days | Discharge: HOME | End: 2018-12-13
Payer: COMMERCIAL

## 2018-12-12 VITALS — HEART RATE: 67 BPM | SYSTOLIC BLOOD PRESSURE: 125 MMHG | DIASTOLIC BLOOD PRESSURE: 80 MMHG | TEMPERATURE: 98.9 F

## 2018-12-12 VITALS — BODY MASS INDEX: 38.5 KG/M2
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
## 2018-12-12 DIAGNOSIS — S92.912A: ICD-10-CM
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
## 2018-12-12 DIAGNOSIS — Z79.01: ICD-10-CM

## 2018-12-12 DIAGNOSIS — F31.9: ICD-10-CM

## 2018-12-12 DIAGNOSIS — S91.205A: Primary | ICD-10-CM

## 2018-12-12 DIAGNOSIS — E78.5: ICD-10-CM

## 2018-12-12 DIAGNOSIS — I20.9: ICD-10-CM

## 2018-12-12 DIAGNOSIS — I10: ICD-10-CM
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
## 2018-12-12 DIAGNOSIS — F41.9: ICD-10-CM

## 2018-12-12 PROCEDURE — 3E0234Z INTRODUCTION OF SERUM, TOXOID AND VACCINE INTO MUSCLE, PERCUTANEOUS APPROACH: ICD-10-PCS

## 2018-12-12 NOTE — PDOC
History of Present Illness





- General


Chief Complaint: Injury


Stated Complaint: INJURY


Time Seen by Provider: 12/12/18 20:11


History Source: Patient


Exam Limitations: No Limitations





Past History





- Past Medical History


Allergies/Adverse Reactions: 


 Allergies











Allergy/AdvReac Type Severity Reaction Status Date / Time


 


ketorolac [From Toradol] Allergy Severe Difficulty Verified 12/12/18 18:45





   Breathing  


 


tramadol Allergy Severe Difficulty Verified 12/12/18 18:45





   Breathing  


 


trazodone Allergy Severe Difficulty Verified 12/12/18 18:45





   Breathing  


 


morphine Allergy  Rash Verified 12/12/18 18:45


 


meperidine HCl [From Demerol] AdvReac   Verified 12/12/18 18:45











Home Medications: 


Ambulatory Orders





Diazepam [Valium -] 10 mg PO TID PRN 07/11/13 


Sumatriptan [Imitrex] 100 mg PO DAILY PRN 07/11/13 


Atorvastatin Ca [Lipitor] 80 mg PO DAILY 10/06/17 


Omeprazole 40 mg PO DAILY 10/06/17 


Quetiapine Fumarate [Seroquel] 100 mg PO TID 10/06/17 


Zolpidem Tartrate [Ambien] 10 mg PO HS PRN 10/06/17 


Atenolol [Tenormin -] 25 mg PO BID #60 tablet 05/15/18 


Furosemide [Lasix -] 40 mg PO DAILY #30 tablet 05/15/18 


Gabapentin 600 mg PO TID #90 tablet 05/15/18 


Orphenadrine Citrate [Orphenadrine Citrate ER] 100 mg PO BID 07/23/18 


Topiramate [Topiramate ER] 50 mg PO TID 07/23/18 


Oxycodone HCl/Acetaminophen [Percocet  mg Tablet] 1 each PO TID MDD 2 11/ 08/18 


Rivaroxaban [Xarelto] 20 mg PO DAILY 11/08/18 


Cephalexin Monohydrate [Keflex -] 500 mg PO Q6H #28 capsule 12/12/18 








Cardiac Disorders: Yes (RAPID HEARTBEAT)


COPD: No


GI Disorders: Yes (GERD)


HTN: Yes


Hypercholesterolemia: Yes


Seizures: Yes (BIPOLAR)





- Surgical History


Abdominal Surgery: Yes (bilat hernia)


Appendectomy: Yes





- Immunization History


Immunization Up to Date: No





- Suicide/Smoking/Psychosocial Hx


Smoking Status: No


Smoking History: Unknown if ever smoked


Have you smoked in the past 12 months: No


Number of Cigarettes Smoked Daily: 0


Hx Alcohol Use: No


Drug/Substance Use Hx: No


Substance Use Type: None


Hx Substance Use Treatment: No





*Physical Exam





- Vital Signs


 Last Vital Signs











Temp Pulse Resp BP Pulse Ox


 


 98.9 F   67   16   125/80   99 


 


 12/12/18 20:14  12/12/18 20:14  12/12/18 20:14  12/12/18 20:14  12/12/18 20:14














- Physical Exam


General Appearance: No: Apparent Distress


Respiratory/Chest: positive: Lungs Clear, Normal Breath Sounds.  negative: 

Respiratory Distress


Cardiovascular: positive: Regular Rhythm, Regular Rate, S1, S2.  negative: 

Murmur


Extremity: positive: Other (+nail avulsion of L 2nd toe with clotted blood, no 

active bleeding, no laceration noted, normal capillary refill, normal sensation)


Neurologic: positive: Fully Oriented, Alert, Normal Mood/Affect





Moderate Sedation





- Procedure Monitoring


Vital Signs: 


Procedure Monitoring Vital Signs











Temperature  98.9 F   12/12/18 20:14


 


Pulse Rate  67   12/12/18 20:14


 


Respiratory Rate  16   12/12/18 20:14


 


Blood Pressure  125/80   12/12/18 20:14


 


O2 Sat by Pulse Oximetry (%)  99   12/12/18 20:14











ED Treatment Course





- RADIOLOGY


Radiology Studies Ordered: 














 Category Date Time Status


 


 TOE(S) LEFT [RAD] Stat Radiology  12/12/18 22:25 Ordered














- Medications


Given in the ED: 


ED Medications














Discontinued Medications














Generic Name Dose Route Start Last Admin





  Trade Name Freq  PRN Reason Stop Dose Admin


 


Diphtheria/Tetanus/Acell Pertussis  0.5 ml  12/12/18 21:54  12/12/18 22:13





  Boostrix -  IM  12/12/18 21:55  0.5 ml





  .ONCE ONE   Administration





     





     





     





     


 


Oxycodone/Acetaminophen  1 combo  12/12/18 21:54  12/12/18 22:13





  Percocet 5/325 -  PO  12/12/18 21:55  1 combo





  ONCE ONE   Administration





     





     





     





     














Medical Decision Making





- Medical Decision Making


52 y/o M hx of anxiety, bipolar, angina, PE (finished his course of Xarelto 1 

week ago), HTN, HLD presents with injury to L 2nd toe after 30 ounce shampoo 

bottle fell on it last night. Patient states the nail was loose and he pulled 

it off himself. Denies fever, numbness. Patient cleaned toe with alcohol and 

hydrogen peroxide. Unsure of last tetanus.





PE notable for complete removal of L 2nd toenail; no active bleeding or 

laceration noted.


Initial toe xray raises concern for FB along with tuft fracture


Toe was irrigated


Will send for xray again to see if FB is still visible


Tetanus updated; patient given Keflex given open wound 





12/12/18 22:39





Repeat x-ray reviewed with Dr Heller and no FB noted


L 2nd toe was covered with Xeroform dressing, and kristy taped with adjacent toe


Patient has f/u with his PCP in 2 days; advised to also get his wound rechecked.





12/12/18 23:45








*DC/Admit/Observation/Transfer


Diagnosis at time of Disposition: 


 Nail avulsion of toe





Toe fracture


Qualifiers:


 Encounter type: initial encounter Toe: unspecified toe Fracture type: closed 

Fracture alignment: nondisplaced Laterality: left Qualified Code(s): S92.912A - 

Unspecified fracture of left toe(s), initial encounter for closed fracture








- Discharge Dispostion


Disposition: HOME


Condition at time of disposition: Good


Decision to Admit order: No





- Prescriptions


Prescriptions: 


Cephalexin Monohydrate [Keflex -] 500 mg PO Q6H #28 capsule





- Referrals





- Patient Instructions


Printed Discharge Instructions:  DI for Toe Fracture, DI for Nail Avulsion 

Injury


Additional Instructions: 





Thank you for choosing [facility].  It was a pleasure taking 

care of you.  





Please follow-up with your PCP in 2 days, as already scheduled, for wound 

recheck


Keep wound dry and clean 





Return to the Emergency Department if your symptoms worsen or persist, you have 

fever, increased swelling, redness around toe, pustular discharge from toe or 

other concerning symptoms. 





- Post Discharge Activity

## 2018-12-12 NOTE — PDOC
Rapid Medical Evaluation


Time Seen by Provider: 12/12/18 20:11


Medical Evaluation: 


 Allergies











Allergy/AdvReac Type Severity Reaction Status Date / Time


 


ketorolac [From Toradol] Allergy Severe Difficulty Verified 12/12/18 18:45





   Breathing  


 


tramadol Allergy Severe Difficulty Verified 12/12/18 18:45





   Breathing  


 


trazodone Allergy Severe Difficulty Verified 12/12/18 18:45





   Breathing  


 


morphine Allergy  Rash Verified 12/12/18 18:45


 


meperidine HCl [From Demerol] AdvReac   Verified 12/12/18 18:45











12/12/18 20:11





12/12/18 19:49





I have performed a brief in-person evaluation of this patient.





The patient presents with a chief complaint of: Seen in ED >1 hr ago for toe 

injury, dx with toe fracture but was erroneously sent to  12 in main and [facility] 

staff was not able to find pt so made his dispo an elopement. Pt never left 

premises





Pertinent physical exam findings: unremarkable





I have ordered the following:xr showed toe fx earlier





The patient will proceed to the ED for further evaluation











**Discharge Disposition





- Diagnosis


Toe fracture


Qualifiers:


 Encounter type: initial encounter Toe: unspecified toe Fracture type: closed 

Fracture alignment: nondisplaced Laterality: left Qualified Code(s): S92.912A - 

Unspecified fracture of left toe(s), initial encounter for closed fracture








- Referrals





- Patient Instructions





- Post Discharge Activity

## 2018-12-12 NOTE — PDOC
*Physical Exam





- Vital Signs


 Last Vital Signs











Temp Pulse Resp BP Pulse Ox


 


 98.9 F   67   16   125/80   99 


 


 12/12/18 20:14  12/12/18 20:14  12/12/18 20:14  12/12/18 20:14  12/12/18 20:14














- Physical Exam


Comments: 





12/12/18 23:11


Agree with exam as documented by PA





ED Treatment Course





- Medications


Given in the ED: 


ED Medications














Discontinued Medications














Generic Name Dose Route Start Last Admin





  Trade Name James  PRN Reason Stop Dose Admin


 


Cephalexin HCl  500 mg  12/12/18 22:25  12/12/18 22:49





  Keflex -  PO  12/12/18 22:26  500 mg





  ONCE ONE   Administration





     





     





     





     


 


Diphtheria/Tetanus/Acell Pertussis  0.5 ml  12/12/18 21:54  12/12/18 22:13





  Boostrix -  IM  12/12/18 21:55  0.5 ml





  .ONCE ONE   Administration





     





     





     





     


 


Oxycodone/Acetaminophen  1 combo  12/12/18 21:54  12/12/18 22:13





  Percocet 5/325 -  PO  12/12/18 21:55  1 combo





  ONCE ONE   Administration





     





     





     





     














Medical Decision Making





- Medical Decision Making





12/12/18 23:11


Possible tuft fracture on xr of L lower D2 with overlying nail avulsion from 

crush injury questionable FB.  <1cm injury on nailbed, no closable lacerations 

on exam.





Copious and aggressive irrigation of toe


repeat XR





kristy tape


close follow up





*DC/Admit/Observation/Transfer


Diagnosis at time of Disposition: 


 Nail avulsion of toe





Toe fracture


Qualifiers:


 Encounter type: initial encounter Toe: unspecified toe Fracture type: closed 

Fracture alignment: nondisplaced Laterality: left Qualified Code(s): S92.912A - 

Unspecified fracture of left toe(s), initial encounter for closed fracture








- Discharge Dispostion


Disposition: HOME


Condition at time of disposition: Good





- Prescriptions


Prescriptions: 


Cephalexin Monohydrate [Keflex -] 500 mg PO Q6H #28 capsule





- Referrals





- Patient Instructions


Printed Discharge Instructions:  DI for Toe Fracture, DI for Nail Avulsion 

Injury


Additional Instructions: 





Thank you for choosing [facility].  It was a pleasure taking 

care of you.  





Please follow-up with your PCP in 2 days, as already scheduled, for wound 

recheck


Keep wound dry and clean 





Return to the Emergency Department if your symptoms worsen or persist, you have 

fever, increased swelling, redness around toe, pustular discharge from toe or 

other concerning symptoms. 





- Post Discharge Activity

## 2018-12-22 ENCOUNTER — HOSPITAL ENCOUNTER (INPATIENT)
Dept: HOSPITAL 74 - JER | Age: 53
LOS: 2 days | Discharge: LEFT BEFORE DISCHARGE | DRG: 558 | End: 2018-12-24
Attending: INTERNAL MEDICINE | Admitting: INTERNAL MEDICINE
Payer: COMMERCIAL

## 2018-12-22 VITALS — BODY MASS INDEX: 39.7 KG/M2

## 2018-12-22 DIAGNOSIS — E66.9: ICD-10-CM

## 2018-12-22 DIAGNOSIS — Z96.653: ICD-10-CM

## 2018-12-22 DIAGNOSIS — K21.9: ICD-10-CM

## 2018-12-22 DIAGNOSIS — R60.9: ICD-10-CM

## 2018-12-22 DIAGNOSIS — Z86.718: ICD-10-CM

## 2018-12-22 DIAGNOSIS — M79.18: ICD-10-CM

## 2018-12-22 DIAGNOSIS — I50.30: ICD-10-CM

## 2018-12-22 DIAGNOSIS — F41.9: ICD-10-CM

## 2018-12-22 DIAGNOSIS — M62.82: Primary | ICD-10-CM

## 2018-12-22 DIAGNOSIS — R06.02: ICD-10-CM

## 2018-12-22 DIAGNOSIS — E83.51: ICD-10-CM

## 2018-12-22 DIAGNOSIS — E78.5: ICD-10-CM

## 2018-12-22 DIAGNOSIS — I11.0: ICD-10-CM

## 2018-12-22 DIAGNOSIS — F31.9: ICD-10-CM

## 2018-12-22 LAB
ALBUMIN SERPL-MCNC: 3.7 G/DL (ref 3.4–5)
ALP SERPL-CCNC: 137 U/L (ref 45–117)
ALT SERPL-CCNC: 44 U/L (ref 13–61)
ANION GAP SERPL CALC-SCNC: 6 MMOL/L (ref 8–16)
AST SERPL-CCNC: 95 U/L (ref 15–37)
BILIRUB SERPL-MCNC: 0.4 MG/DL (ref 0.2–1)
BNP SERPL-MCNC: 64.8 PG/ML (ref 5–125)
BUN SERPL-MCNC: 12 MG/DL (ref 7–18)
CALCIUM SERPL-MCNC: 7.7 MG/DL (ref 8.5–10.1)
CHLORIDE SERPL-SCNC: 102 MMOL/L (ref 98–107)
CO2 SERPL-SCNC: 27 MMOL/L (ref 21–32)
CREAT SERPL-MCNC: 1.3 MG/DL (ref 0.55–1.3)
DEPRECATED RDW RBC AUTO: 14.5 % (ref 11.9–15.9)
GLUCOSE SERPL-MCNC: 120 MG/DL (ref 74–106)
HCT VFR BLD CALC: 39.6 % (ref 35.4–49)
HGB BLD-MCNC: 13.5 GM/DL (ref 11.7–16.9)
MAGNESIUM SERPL-MCNC: 2.1 MG/DL (ref 1.8–2.4)
MCH RBC QN AUTO: 31.4 PG (ref 25.7–33.7)
MCHC RBC AUTO-ENTMCNC: 34.1 G/DL (ref 32–35.9)
MCV RBC: 91.9 FL (ref 80–96)
PLATELET # BLD AUTO: 198 K/MM3 (ref 134–434)
PMV BLD: 8.9 FL (ref 7.5–11.1)
POTASSIUM SERPLBLD-SCNC: 4 MMOL/L (ref 3.5–5.1)
PROT SERPL-MCNC: 6.6 G/DL (ref 6.4–8.2)
RBC # BLD AUTO: 4.31 M/MM3 (ref 4–5.6)
SODIUM SERPL-SCNC: 135 MMOL/L (ref 136–145)
WBC # BLD AUTO: 8.7 K/MM3 (ref 4–10)

## 2018-12-22 NOTE — PDOC
History of Present Illness





- General


History Source: Patient


Exam Limitations: No Limitations





- History of Present Illness


Initial Comments: 





12/22/18 20:51


Pt. is a 53 y.o. M w/ PMHx. of CHFpEF, HTN, HLD, DVT x 2 (1977 and 2018), known 

soft plaque in LAD(no stents), recent stress test in the last 2 weeks that was 

benign with EF of 65% presents to the ED complaining of worsening shortness of 

breath on exertion, worsening lower extremity edema. Pt. was recently seen by 

Dr. Mckeon last week where his Xarelto was discontinued indefinitely and his 

Lasix dose was decreased back to 40mg Daily after a failed trial of 40mg BID. 

Pt. states that for the last few days he has been taking Lasix 40mg twice a 

day. Pt. states that the last time he had his BMP checked was 1 month ago. He 

was last discharged with a potassium of 3.1. Pt. states that he currently 

sleeps with 4 pillows at night and frequently sleeps with on the couch. Of note 

Pt. was recently admitted for a left 2nd toe fracture. Pt. has extensive PSHx. 

of upper and lower extremity surgeries including bilateral knee replacements 

and left achilles tear. Pt. complains of associated lower extremity pain, 

dizziness, balance issues, inability to maintain stream when urinating, and a 

feeling of a lack of urination despite adequate fluid intake. Pt. endorses 

equal sensation in b/l lower extremities however decreased ROM d/t swelling. 

Pt. denies fever but endorses sweats and chills last night. 





CBC, CMP, Mag, CXR, Troponin, EKG and BNP ordered to assess for acute CHF 

exacerbation.     


12/22/18 21:55


250ml NS given and 1gm Ofirmev for pain


12/22/18 22:40


Cardiac profile and CMP results show CK: 3524, CK-MB elevated, Negative 

Troponins and K+: 4.0, AST 2x ALT, ALP mildy elevated 


Pt. to be admitted for suspected rhabdomyolysis














Timing/Duration: getting worse


Severity: mild


Modifying Factors: improves with: immobilization, rest


Associated Symptoms: reports: fever/chills, shortness of breath, weakness


Aspirin Received prior to arrival: Yes: no aspirin today


Beta Blocker Taken at Home(Core Measure): Yes





<Albino Bustillos - Last Filed: 12/22/18 23:54>





<Yoanna Stevens - Last Filed: 12/23/18 01:10>





- General


Chief Complaint: Edema


Stated Complaint: SWELLING TO FEET AND ABDOMINAL PAIN


Time Seen by Provider: 12/22/18 20:48





Past History





- Travel


Traveled outside of the country in the last 30 days: No


Close contact w/someone who was outside of country & ill: No





- Past Medical History


Cardiac Disorders: Yes (RAPID HEARTBEAT)


COPD: No


CHF: Yes


DVT: Yes


Diabetes: No


GI Disorders: Yes (GERD)


HTN: Yes


Hypercholesterolemia: Yes


Seizures: Yes (BIPOLAR)





- Surgical History


Abdominal Surgery: Yes (bilat hernia)


Appendectomy: Yes


Orthopedic Surgery: Yes





- Immunization History


Immunization Up to Date: No





- Suicide/Smoking/Psychosocial Hx


Smoking Status: No


Smoking History: Never smoked


Have you smoked in the past 12 months: No


Number of Cigarettes Smoked Daily: 0


Hx Alcohol Use: No


Drug/Substance Use Hx: No


Substance Use Type: None


Hx Substance Use Treatment: No





<Albino Bustillos - Last Filed: 12/22/18 23:54>





<Yoanna Stevens - Last Filed: 12/23/18 01:10>





- Past Medical History


Allergies/Adverse Reactions: 


 Allergies











Allergy/AdvReac Type Severity Reaction Status Date / Time


 


ketorolac [From Toradol] Allergy Severe Difficulty Verified 12/22/18 19:06





   Breathing  


 


tramadol Allergy Severe Difficulty Verified 12/22/18 19:06





   Breathing  


 


trazodone Allergy Severe Difficulty Verified 12/22/18 19:06





   Breathing  


 


morphine Allergy  Rash Verified 12/22/18 19:06


 


meperidine HCl [From Demerol] AdvReac   Verified 12/22/18 19:06











Home Medications: 


Ambulatory Orders





Diazepam [Valium -] 10 mg PO TID PRN 07/11/13 


Sumatriptan [Imitrex] 100 mg PO DAILY PRN 07/11/13 


Atorvastatin Ca [Lipitor] 80 mg PO DAILY 10/06/17 


Omeprazole 40 mg PO DAILY 10/06/17 


Quetiapine Fumarate [Seroquel] 100 mg PO TID 10/06/17 


Zolpidem Tartrate [Ambien] 10 mg PO HS PRN 10/06/17 


Atenolol [Tenormin -] 25 mg PO BID #60 tablet 05/15/18 


Furosemide [Lasix -] 40 mg PO DAILY #30 tablet 05/15/18 


Gabapentin 600 mg PO TID #90 tablet 05/15/18 


Orphenadrine Citrate [Orphenadrine Citrate ER] 100 mg PO BID 07/23/18 


Topiramate [Topiramate ER] 50 mg PO TID 07/23/18 


Oxycodone HCl/Acetaminophen [Percocet  mg Tablet] 1 each PO TID MDD 2 11/ 08/18 


Cephalexin Monohydrate [Keflex -] 500 mg PO Q6H #28 capsule 12/12/18 











**Review of Systems





- Review of Systems


Able to Perform ROS?: Yes


Is the patient limited English proficient: No


Constitutional: Yes: Chills, Night Sweats


Respiratory: Yes: Orthopnea, Shortness of Breath, SOB with Exertion


Cardiac (ROS): Yes: Edema, Lightheadedness.  No: Chest Pain, Palpitations


ABD/GI: No: Symptoms Reported


: Yes: Symptoms Reported (difficulty maintaining stream of urine )


Musculoskeletal: Yes: Joint Pain, Muscle Pain, Muscle Weakness


Neurological: Yes: Weakness, Unsteady Gait, Dizziness


Hematologic/Lymphatic: Yes: Blood Clots





<Albino Bustillos - Last Filed: 12/22/18 23:54>





*Physical Exam





- Vital Signs


 Last Vital Signs











Temp Pulse Resp BP Pulse Ox


 


 97.4 F L  85   18   113/62   99 


 


 12/22/18 19:04  12/22/18 19:04  12/22/18 19:04  12/22/18 19:04  12/22/18 19:04














- Physical Exam


General Appearance: Yes: Nourished, Appropriately Dressed, Apparent Distress


HEENT: positive: Normal Voice, Symmetrical


Neck: positive: Supple


Respiratory/Chest: positive: Lungs Clear, Normal Breath Sounds.  negative: 

Chest Tender, Respiratory Distress, Accessory Muscle Use, Crackles, Rales, 

Wheezing


Cardiovascular: positive: Regular Rhythm, Regular Rate, S1, S2, Edema, JVD.  

negative: Murmur


Vascular Pulses: Dorsalis-Pedis (R): 2+, Doralis-Pedis (L): 2+


Gastrointestinal/Abdominal: positive: Normal Bowel Sounds, Soft


Musculoskeletal: positive: Decreased Range of Motion.  negative: CVA Tenderness


Extremity: positive: Normal Capillary Refill, Normal Inspection, Tender, Pedal 

Edema (2+), Swelling, Calf Tenderness, Erythema.  negative: Normal Range of 

Motion


Integumentary: positive: Dry, Warm, Swelling, Other (Left second toe lesion, 

non erythematous, non-tender, no focal deficits )


Neurologic: positive: Fully Oriented, Alert, Normal Response, Motor Strength 5/5





<Albino Bustillos - Last Filed: 12/22/18 23:54>





- Vital Signs


 Last Vital Signs











Temp Pulse Resp BP Pulse Ox


 


 97.4 F L  85   18   113/62   99 


 


 12/22/18 19:04  12/22/18 19:04  12/22/18 19:04  12/22/18 19:04  12/22/18 21:36














<Yoanna Stevens - Last Filed: 12/23/18 01:10>





Moderate Sedation





- Procedure Monitoring


Vital Signs: 


Procedure Monitoring Vital Signs











Temperature  97.4 F L  12/22/18 19:04


 


Pulse Rate  85   12/22/18 19:04


 


Respiratory Rate  18   12/22/18 19:04


 


Blood Pressure  113/62   12/22/18 19:04


 


O2 Sat by Pulse Oximetry (%)  99   12/22/18 19:04











<Albino Bustillos - Last Filed: 12/22/18 23:54>





- Procedure Monitoring


Vital Signs: 


Procedure Monitoring Vital Signs











Temperature  97.4 F L  12/22/18 19:04


 


Pulse Rate  85   12/22/18 19:04


 


Respiratory Rate  18   12/22/18 19:04


 


Blood Pressure  113/62   12/22/18 19:04


 


O2 Sat by Pulse Oximetry (%)  99   12/22/18 21:36











<Yoanna Stevens - Last Filed: 12/23/18 01:10>





ED Treatment Course





- LABORATORY


CBC & Chemistry Diagram: 


 12/22/18 21:20





 12/22/18 21:28





- RADIOLOGY


Radiology Studies Ordered: 














 Category Date Time Status


 


 CXRPORT [CHEST X-RAY PORTABLE*] [RAD] Stat Radiology  12/22/18 20:50 Ordered














<Albino Bustillos - Last Filed: 12/22/18 23:54>





- LABORATORY


CBC & Chemistry Diagram: 


 12/22/18 21:20





 12/22/18 21:28





- ADDITIONAL ORDERS


Additional order review: 


 Laboratory  Results











  12/22/18 12/22/18





  21:28 21:28


 


Sodium   135 L


 


Potassium   4.0


 


Chloride   102


 


Carbon Dioxide   27


 


Anion Gap   6 L


 


BUN   12


 


Creatinine   1.3


 


Creat Clearance w eGFR   57.53


 


Random Glucose   120 H


 


Calcium   7.7 L


 


Magnesium   2.1


 


Total Bilirubin   0.4


 


AST   95 H


 


ALT   44


 


Alkaline Phosphatase   137 H


 


Creatine Kinase  3524 H 


 


Creatine Kinase Index  0.4 


 


CK-MB (CK-2)  16.9 H 


 


Troponin I  < 0.02 


 


B-Natriuretic Peptide   64.8


 


Total Protein   6.6


 


Albumin   3.7








 











  12/22/18





  21:20


 


RBC  4.31


 


MCV  91.9


 


MCHC  34.1


 


RDW  14.5


 


MPV  8.9














- Medications


Given in the ED: 


ED Medications














Discontinued Medications














Generic Name Dose Route Start Last Admin





  Trade Name James  PRN Reason Stop Dose Admin


 


Acetaminophen  1,000 mg  12/22/18 21:41  12/22/18 21:55





  Ofirmev Injection -  IVPB  12/22/18 21:42  1,000 mg





  ONCE ONE   Administration





     





     





     





     


 


Calcium Carbonate  650 mg  12/22/18 22:45  12/22/18 23:10





  Calcium Carbonate -  PO  12/22/18 22:46  650 mg





  NOW ONE   Administration





     





     





     





     


 


Sodium Chloride  250 ml  12/22/18 21:40  12/22/18 21:55





  Normal Saline -  IV  12/22/18 21:41  250 ml





  ONCE ONE   Administration





     





     





     





     














<Yoanna Stevens - Last Filed: 12/23/18 01:10>





*DC/Admit/Observation/Transfer





<Albino Bustillos - Last Filed: 12/22/18 23:54>





- Discharge Dispostion


Decision to Admit order: Yes





<Yoanna Stevens - Last Filed: 12/23/18 01:10>


Diagnosis at time of Disposition: 


 Lower extremity edema, Musculoskeletal pain, Leg swelling, Shortness of breath








- Discharge Dispostion


Condition at time of disposition: Stable





- Referrals


Referrals: 


Osiel Barakat MD [Primary Care Provider] - 


Jose Mckeon MD [Staff Physician] - 1 week





- Patient Instructions


Additional Instructions: 


You came in for shortness of breath, leg swelling and edema. 





We evaluated you for acute CHF exacerbation.





Please continue taking your medication as prescribed, including your Lasix 40mg 

DAILY





Please follow up with your Primary Care Physician within 1 week.





Please follow up with your Cardiologist within 1 week to discuss your Lasix 

dose. 





Please return to the ED if you are experiencing chest pain, worsening shortness 

of breath, fever, notice that you have been gaining more than 3 pounds over the 

course of 2 days or any other concerning symptoms. 





- Post Discharge Activity

## 2018-12-22 NOTE — PDOC
Attending Attestation





- HPI


HPI: 





12/22/18 22:09


The patient is a 53 year old male, with a significant past medical history of 

anxiety, bipolar, angina, PE (on Xarelto) HTN, HLD and morbid obesity, who 

presents to the emergency department with, bilateral lower extremity edema. He 

endorses doubling his Lasix prior to his arrival, with minimal relief.








Allergies: Ketorolac, tramadol, trazodone, morphine, meperidine HCL


Social history: denies tobacco use


PCP: Dr. Osiel Mcneil








- Physicial Exam


PE: 





12/22/18 22:09


GENERAL: Awake, alert, and fully oriented, in no acute distress


HEAD: No signs of trauma


EYES: PERRLA, EOMI, sclera anicteric, conjunctiva clear


ENT: Auricles normal inspection, hearing grossly normal, nares patent, 

oropharynx clear without exudates. Moist mucosa


NECK: Normal ROM, supple, no lymphadenopathy, JVD, or masses


LUNGS: Breath sounds equal, clear to auscultation bilaterally.  No wheezes, and 

no crackles


HEART: Regular rate and rhythm, normal S1 and S2, no murmurs, rubs or gallops


ABDOMEN: Soft, nontender, normoactive bowel sounds.  No guarding, no rebound.  

No masses


+EXTREMITIES: 1+ pitting edema bilaterally Normal range of motion.  No clubbing 

or cyanosis. No cords, erythema, or tenderness


NEUROLOGICAL: Cranial nerves II through XII grossly intact.  Normal speech, 

normal gait


SKIN: Warm, Dry, normal turgor, no rashes or lesions noted.








<Sailaja Yan - Last Filed: 12/22/18 22:09>





- Resident


Resident Name: Albino Bustillos





- ED Attending Attestation


I have performed the following: I have examined & evaluated the patient, The 

case was reviewed & discussed with the resident, I agree w/resident's findings 

& plan





- Medical Decision Making





12/22/18 22:37


Pt has rhabdomyolysis, and hypocalcemia and weakness. He will be admitted to 

Dr. Valentino for admission; we will speak to Dr. Hensley, who is on call.





<Yoanna Stevens - Last Filed: 12/22/18 22:39>





Attestations





- Attestations





12/22/18 22:10





Documentation prepared by Nirvannie Goberdan, acting as medical scribe for 

Yoanna Stevens MD.





<Sailaja Yan - Last Filed: 12/22/18 22:09>

## 2018-12-23 LAB
ALBUMIN SERPL-MCNC: 3.3 G/DL (ref 3.4–5)
ALP SERPL-CCNC: 126 U/L (ref 45–117)
ALT SERPL-CCNC: 44 U/L (ref 13–61)
ANION GAP SERPL CALC-SCNC: 8 MMOL/L (ref 8–16)
AST SERPL-CCNC: 90 U/L (ref 15–37)
BILIRUB SERPL-MCNC: 0.5 MG/DL (ref 0.2–1)
BUN SERPL-MCNC: 13 MG/DL (ref 7–18)
CALCIUM SERPL-MCNC: 7.9 MG/DL (ref 8.5–10.1)
CHLORIDE SERPL-SCNC: 99 MMOL/L (ref 98–107)
CO2 SERPL-SCNC: 29 MMOL/L (ref 21–32)
CREAT SERPL-MCNC: 1.4 MG/DL (ref 0.55–1.3)
DEPRECATED RDW RBC AUTO: 14.9 % (ref 11.9–15.9)
GLUCOSE SERPL-MCNC: 124 MG/DL (ref 74–106)
HCT VFR BLD CALC: 38.9 % (ref 35.4–49)
HGB BLD-MCNC: 12.7 GM/DL (ref 11.7–16.9)
MCH RBC QN AUTO: 30.2 PG (ref 25.7–33.7)
MCHC RBC AUTO-ENTMCNC: 32.6 G/DL (ref 32–35.9)
MCV RBC: 92.5 FL (ref 80–96)
PLATELET # BLD AUTO: 183 K/MM3 (ref 134–434)
PMV BLD: 8.4 FL (ref 7.5–11.1)
POTASSIUM SERPLBLD-SCNC: 4 MMOL/L (ref 3.5–5.1)
PROT SERPL-MCNC: 6.2 G/DL (ref 6.4–8.2)
RBC # BLD AUTO: 4.2 M/MM3 (ref 4–5.6)
SODIUM SERPL-SCNC: 136 MMOL/L (ref 136–145)
WBC # BLD AUTO: 8.4 K/MM3 (ref 4–10)

## 2018-12-23 RX ADMIN — CEPHALEXIN SCH MG: 500 CAPSULE ORAL at 23:48

## 2018-12-23 RX ADMIN — PANTOPRAZOLE SODIUM SCH MG: 40 TABLET, DELAYED RELEASE ORAL at 10:25

## 2018-12-23 RX ADMIN — QUETIAPINE FUMARATE SCH MG: 100 TABLET ORAL at 06:23

## 2018-12-23 RX ADMIN — Medication SCH MG: at 10:25

## 2018-12-23 RX ADMIN — QUETIAPINE FUMARATE SCH MG: 100 TABLET ORAL at 21:37

## 2018-12-23 RX ADMIN — SODIUM CHLORIDE SCH MLS/HR: 9 INJECTION, SOLUTION INTRAVENOUS at 21:35

## 2018-12-23 RX ADMIN — SODIUM CHLORIDE SCH MLS/HR: 9 INJECTION, SOLUTION INTRAVENOUS at 04:04

## 2018-12-23 RX ADMIN — GABAPENTIN SCH MG: 300 CAPSULE ORAL at 06:23

## 2018-12-23 RX ADMIN — TOPIRAMATE SCH MG: 25 TABLET, FILM COATED ORAL at 14:21

## 2018-12-23 RX ADMIN — ATENOLOL SCH MG: 25 TABLET ORAL at 10:25

## 2018-12-23 RX ADMIN — ATENOLOL SCH MG: 25 TABLET ORAL at 21:36

## 2018-12-23 RX ADMIN — FUROSEMIDE SCH MG: 40 TABLET ORAL at 10:25

## 2018-12-23 RX ADMIN — Medication SCH MG: at 21:36

## 2018-12-23 RX ADMIN — CEPHALEXIN SCH MG: 500 CAPSULE ORAL at 06:22

## 2018-12-23 RX ADMIN — NYSTATIN SCH UNITS: 100000 SUSPENSION ORAL at 18:59

## 2018-12-23 RX ADMIN — GABAPENTIN SCH MG: 300 CAPSULE ORAL at 14:21

## 2018-12-23 RX ADMIN — TOPIRAMATE SCH MG: 25 TABLET, FILM COATED ORAL at 21:36

## 2018-12-23 RX ADMIN — GABAPENTIN SCH MG: 300 CAPSULE ORAL at 21:36

## 2018-12-23 RX ADMIN — CEPHALEXIN SCH MG: 500 CAPSULE ORAL at 18:07

## 2018-12-23 RX ADMIN — NYSTATIN SCH UNITS: 100000 SUSPENSION ORAL at 22:25

## 2018-12-23 RX ADMIN — CEPHALEXIN SCH MG: 500 CAPSULE ORAL at 12:12

## 2018-12-23 RX ADMIN — TOPIRAMATE SCH MG: 25 TABLET, FILM COATED ORAL at 06:23

## 2018-12-23 RX ADMIN — QUETIAPINE FUMARATE SCH MG: 100 TABLET ORAL at 14:21

## 2018-12-23 NOTE — HP
DATE OF ADMISSION:  12/23/2018

 

CHIEF COMPLAINT:  Increasing edema and weakness.

 

This 53-year-old male with a past medical history of anxiety, bipolar disorder,

coronary artery disease, PE, hypertension, hyperlipidemia, and morbid obesity,

presented to the emergency room with increasing generalized weakness and leg

swelling.  The patient does not report any other specific complaints.  

 

PAST MEDICAL HISTORY:  As above.

 

PAST SURGICAL HISTORY:  None known.

 

FAMILY AND SOCIAL HISTORY:  Unremarkable.

 

Multiple allergies, including TORADOL, TRAMADOL, TRAZODONE, MORPHINE, MEPERIDINE.

 

Home medications reviewed.

 

REVIEW OF SYSTEMS:  Unremarkable except for presenting complaints.

 

PHYSICAL EXAMINATION:

General:  This is a morbidly obese male in no acute distress.

Vital Signs:  Temperature on admission was 98.7 degrees Fahrenheit.  Pulse 76 per

minute, regular.  Blood pressure 105/64 with no orthostatic changes.  Oxygen

saturation 98%.

HEENT:  Within normal limits.

Neck:  Supple, with no JVD.

Chest:  Clear to auscultation.

Heart:  Rate and rhythm regular.  S1 and S2 heard.

Abdomen:  Soft.  Nontender, nondistended, with normal bowel sounds.

Extremities:  1+ edema with chronic changes.

Neurological:  The patient was awake, alert, oriented x3, with no gross focal

neurological deficits.

 

LABORATORY EXAMINATION:  WBC count 8.7, hemoglobin 13.5, hematocrit 39.6%, platelet

count 198.  Sodium 135, potassium 4, BUN 12, creatinine 1.3.  Total creatine kinase

level was 3524.

 

IMPRESSION:  Rhabdomyolysis, history of anxiety, morbid obesity, bipolar disorder,

coronary artery disease, PE, hypertension, and hyperlipidemia.

 

PLAN:  The patient will be admitted and will be hydrated.  Serial CPK levels will be

tested.  The patient's other usual medications will be continued.  Further management

will be according to the results of these interventions.

 

 

LIYAH [id number]

DD: 12/23/2018 14:25

DT: 12/23/2018 15:03

Job #:  [id number]

## 2018-12-24 VITALS — SYSTOLIC BLOOD PRESSURE: 107 MMHG | HEART RATE: 73 BPM | DIASTOLIC BLOOD PRESSURE: 61 MMHG | TEMPERATURE: 97.4 F

## 2018-12-24 RX ADMIN — QUETIAPINE FUMARATE SCH MG: 100 TABLET ORAL at 06:43

## 2018-12-24 RX ADMIN — CEPHALEXIN SCH MG: 500 CAPSULE ORAL at 05:57

## 2018-12-24 RX ADMIN — QUETIAPINE FUMARATE SCH: 100 TABLET ORAL at 13:06

## 2018-12-24 RX ADMIN — CEPHALEXIN SCH: 500 CAPSULE ORAL at 17:41

## 2018-12-24 RX ADMIN — CEPHALEXIN SCH MG: 500 CAPSULE ORAL at 11:54

## 2018-12-24 RX ADMIN — ATENOLOL SCH MG: 25 TABLET ORAL at 09:17

## 2018-12-24 RX ADMIN — SODIUM CHLORIDE SCH MLS/HR: 9 INJECTION, SOLUTION INTRAVENOUS at 09:19

## 2018-12-24 RX ADMIN — SODIUM CHLORIDE SCH: 9 INJECTION, SOLUTION INTRAVENOUS at 02:15

## 2018-12-24 RX ADMIN — GABAPENTIN SCH MG: 300 CAPSULE ORAL at 05:57

## 2018-12-24 RX ADMIN — NYSTATIN SCH UNITS: 100000 SUSPENSION ORAL at 09:17

## 2018-12-24 RX ADMIN — TOPIRAMATE SCH MG: 25 TABLET, FILM COATED ORAL at 13:04

## 2018-12-24 RX ADMIN — NYSTATIN SCH: 100000 SUSPENSION ORAL at 17:42

## 2018-12-24 RX ADMIN — GABAPENTIN SCH MG: 300 CAPSULE ORAL at 13:04

## 2018-12-24 RX ADMIN — PANTOPRAZOLE SODIUM SCH MG: 40 TABLET, DELAYED RELEASE ORAL at 09:17

## 2018-12-24 RX ADMIN — FUROSEMIDE SCH MG: 40 TABLET ORAL at 09:17

## 2018-12-24 RX ADMIN — Medication SCH MG: at 09:17

## 2018-12-24 RX ADMIN — TOPIRAMATE SCH MG: 25 TABLET, FILM COATED ORAL at 05:57

## 2018-12-24 RX ADMIN — NYSTATIN SCH UNITS: 100000 SUSPENSION ORAL at 13:05

## 2018-12-24 NOTE — EKG
Test Reason : 

Blood Pressure : ***/*** mmHG

Vent. Rate : 074 BPM     Atrial Rate : 074 BPM

   P-R Int : 162 ms          QRS Dur : 086 ms

    QT Int : 394 ms       P-R-T Axes : 022 -03 019 degrees

   QTc Int : 437 ms

 

NORMAL SINUS RHYTHM

NORMAL ECG

WHEN COMPARED WITH ECG OF 28-NOV-2018 14:51,

NO SIGNIFICANT CHANGE WAS FOUND

Confirmed by DANY ZAMORA MD ([id number]) on 12/24/2018 7:17:43 AM

 

Referred By:             Confirmed By:DANY ZAMORA MD

## 2023-03-16 NOTE — PDOC
Attending Attestation





- HPI


HPI: 


09/30/18 23:05


The patient is a 53 year old male with significant past medical history of HTN, 

CAD,  PE (05/2018), DVT, HLD, Bipolar disorder, Depression, chronic pain who 

presents to the ED with complaint of diffuse chest tightness since the morning. 

He also reports associated SOB and swelling to his lower extremities. He 

reports his chest tightness and SOB is exacerbated with walking.  Patient 

reportedly takes daily Furosemide 40 mg, but took 80 mg today. 





The patient denies headache and dizziness. The patient denies fever, chills, 

vomit, diarrhea and constipation. The patient denies dysuria, frequency, 

urgency and hematuria. 





Social Hx: Denies Etoh, tobacco use, IVDA


Allergies: ketorolac, tramadol, trazadone, morphine, meperidine. 








- Physicial Exam


PE: 





09/30/18 23:06


GENERAL: 


Well-appearing, well-nourished. No apparent distress.


HEENT: 


Normocephalic, atraumatic. PERRL, EOM intact.


CARDIOVASCULAR: 


Normal S1, S2. Regular rate and rhythm.


PULMONARY: 


Clear to auscultation bilaterally.


ABDOMEN: 


Soft, non-distended, non-tender. 


EXTREMITIES: 


(+) bilateral 1+ pitting Edema to lower extremities. The right LE is larger 

than the left LE. Normal ROM in all four extremities. 


SKIN: 


(+) scattered areas of excoriation to the bilateral LE. No weeping or 

ulcerations. Warm, dry.  No rash


NEUROLOGICAL: 


No focal neurological deficits.








- Medical Decision Making





09/30/18 23:06





Documentation prepared by Sondra Estrada, acting as medical scribe for Bronwyn Aviles MD


________________________________________________________________________________

______





09/30/18 23:07


I agree with Resident note and plan. 








10/01/2018 01:07 EST


EXAM: Ultrasound DUPLEX VASCULAR US-2 LEGS


HISTORY: Concern for deep vein thrombosis


COMPARISON: None.


FINDINGS: Ultrasound of the left and right lower extremity veins demonstrates 

normal


compression flow and augmentation


IMPRESSION: No deep vein thrombosis





THIS DOCUMENT HAS BEEN ELECTRONICALLY SIGNED


Kaiden Carlos MD








<Sondra Estrada - Last Filed: 10/01/18 01:20>





- Resident


Resident Name: Tobias Lópezson





- ED Attending Attestation


I have performed the following: I have examined & evaluated the patient, The 

case was reviewed & discussed with the resident, I agree w/resident's findings 

& plan, Exceptions are as noted





- HPI


HPI: 





09/30/18 22:56


54 yo male presents with concern of left leg swelling. HE had been on lasix in 

the past. Yaya any trauma





- Medical Decision Making


labs reviewed 


duplex doppler





10/01/18 00:03





10/01/18 01:26


The duplex doppler is NEGATIVE for dvt in his lowr extremities


lqabs reviews


imp edema 


plan continue his regular medications and follow up with his PCP





<Bronwyn Aviles - Last Filed: 10/01/18 01:27>
History of Present Illness





- General


Chief Complaint: Edema


Stated Complaint: CHEST PAIN


Time Seen by Provider: 09/30/18 21:52





- History of Present Illness


Initial Comments: 





09/30/18 21:55


54 yo M with h/o HTN, CAD,  PE (05/2018), DVT, HLD, Bipolar disorder, Depression

, chronic pain who p/w diffuse chest tightness. Patient reports waking up this 

AM with diffuse, non radiating, non pleuritic, chest tightness, SOB, BL LE 

swelling, and pressure. SOB, and chest tightness worse with ambulation. Patient 

in normal state of health yesterday. Also, endorses nausea without vomiting, 

and lightheadedness this AM. Patient takes daily Fureosomide 40 mg, but took 80 

mg today. 





Patient denies  F/C, cough, wheezing, palpitations, urinary complaints, 

hematuria, abdominal pain, diarrhea, constipation, BPR, weakness, sensory 

changes. 





PMHx: as noted above. Denies h/o MI, stent placement, CABG


ROS: as noted


SHx: Denies Etoh, tobacco use, IVDA








Past History





- Past Medical History


Allergies/Adverse Reactions: 


 Allergies











Allergy/AdvReac Type Severity Reaction Status Date / Time


 


ketorolac [From Toradol] Allergy Severe Difficulty Verified 07/23/18 18:19





   Breathing  


 


tramadol Allergy Severe Difficulty Verified 07/23/18 18:20





   Breathing  


 


trazodone Allergy Severe Difficulty Verified 07/23/18 18:20





   Breathing  


 


morphine Allergy  Rash Verified 07/23/18 17:46


 


meperidine HCl [From Demerol] AdvReac   Verified 07/23/18 17:46











Home Medications: 


Ambulatory Orders





Diazepam [Valium -] 10 mg PO TID PRN 07/11/13 


Sumatriptan [Imitrex] 100 mg PO DAILY PRN 07/11/13 


Atorvastatin Ca [Lipitor] 80 mg PO DAILY 10/06/17 


Omeprazole 40 mg PO DAILY 10/06/17 


Quetiapine Fumarate [Seroquel] 100 mg PO TID 10/06/17 


Zolpidem Tartrate [Ambien] 10 mg PO HS PRN 10/06/17 


Amlodipine Besylate [Norvasc -] 5 mg PO DAILY #30 tablet 05/15/18 


Atenolol [Tenormin -] 25 mg PO BID #60 tablet 05/15/18 


Furosemide [Lasix -] 40 mg PO DAILY #30 tablet 05/15/18 


Gabapentin 600 mg PO TID #90 tablet 05/15/18 


Oxycodone HCl/Acetaminophen [Percocet  mg Tablet] 1 each PO TID #21 

tablet MDD 3 tabs 05/15/18 


Rivaroxaban [Xarelto -] 15 mg PO BID 21 Days #42 tab 05/15/18 


Ergocalciferol (Vitamin D2) [Vitamin D2] 50,000 unit PO DAILY 07/23/18 


Orphenadrine Citrate 100 mg PO BID 07/23/18 


Oxycodone HCl/Acetaminophen [Percocet  mg Tablet] 1 each PO BID #10 

tablet MDD 2 07/23/18 


Topiramate [Topiramate ER] 50 mg PO BID 07/23/18 


Topiramate [Trokendi Xr] 50 mg PO BID 07/23/18 








Cardiac Disorders: Yes (RAPID HEARTBEAT)


COPD: No


GI Disorders: Yes (GERD)


HTN: Yes


Hypercholesterolemia: Yes


Seizures: Yes (BIPOLAR)





- Surgical History


Abdominal Surgery: Yes (bilat hernia)


Appendectomy: Yes





- Immunization History


Immunization Up to Date: No





- Suicide/Smoking/Psychosocial Hx


Smoking Status: No


Smoking History: Never smoked


Have you smoked in the past 12 months: No


Number of Cigarettes Smoked Daily: 0


Hx Alcohol Use: No


Drug/Substance Use Hx: No


Substance Use Type: None





**Review of Systems





- Review of Systems


Comments:: 





09/30/18 21:56


GENERAL/CONSTITUTIONAL: No fever or chills. No weakness.


HEAD, EYES, EARS, NOSE AND THROAT: No change in vision. No ear pain or 

discharge. No sore throat.


CARDIOVASCULAR: + chest pain and shortness of breath. 


RESPIRATORY: No cough, wheezing, or hemoptysis.


GASTROINTESTINAL: No nausea, vomiting, diarrhea or constipation.


GENITOURINARY: No dysuria, frequency, or change in urination.


MUSCULOSKELETAL: No joint or muscle swelling or pain. No neck or back pain.


SKIN: No rash


NEUROLOGIC: No headache, vertigo, loss of consciousness, or change in strength/

sensation.


ENDOCRINE: No increased thirst. No abnormal weight change


HEMATOLOGIC/LYMPHATIC: No anemia, easy bleeding, or history of blood clots.


ALLERGIC/IMMUNOLOGIC: No hives or skin allergy.











*Physical Exam





- Vital Signs


 Last Vital Signs











Temp Pulse Resp BP Pulse Ox


 


 98.3 F   77   18   117/83   96 


 


 09/30/18 20:22  09/30/18 20:22  09/30/18 20:22  09/30/18 20:22  09/30/18 20:22














- Physical Exam


Comments: 





09/30/18 21:56


GENERAL: Awake, alert, and fully oriented, in no acute distress


HEAD: No signs of trauma, normocephalic, atraumatic 


EYES: PERRLA, EOMI, sclera anicteric, conjunctiva clear


ENT: Hearing grossly normal, nares patent, oropharynx clear without


exudates. Moist mucosa


NECK: Normal ROM, supple, no lymphadenopathy, JVD, or masses


LUNGS: No distress, speaks full sentences, clear to auscultation bilaterally 


HEART: Regular rate and rhythm, normal S1 and S2, no murmurs, rubs or gallops, 

peripheral pulses normal and equal bilaterally. 


EXTREMITIES : BL 1+ pitting edema. Normal inspection, Normal range of motion.  

No clubbing or cyanosis. 


NEUROLOGICAL: Cranial nerves II through XII grossly intact.  Normal speech, no 

focal sensorimotor deficits 


SKIN: Warm, Dry, normal turgor, no rashes or lesions noted








ED Treatment Course





- LABORATORY


CBC & Chemistry Diagram: 


 09/30/18 22:22





 09/30/18 22:22





Medical Decision Making





- Medical Decision Making





09/30/18 22:35


54 yo M with h/o HTN, CAD,  PE (05/2018), DVT, HLD, Bipolar disorder, Depression

, chronic pain who p/w diffuse chest tightness. VSS, AF. ACS/MI r/o. R/o PNA. 

PERC +. Low risk PE based on Weils Criteria. + BL LE 1 + edema. R/o DVT. Will 

evaluate for acute CHF. Low suspicion AAA, Ao dissection, asthma, COPD. 





ED Course: 


CBC,CMP, Cardiac Pr. BNP, UA


EKG, CXR





09/30/18 22:41


EKG: NSR with absent KATINA, STD, Q waves. Normal interval duration and axis. 


10/01/18 01:01





Trop: Neg 


BNP: 219


CBC: Unremarkable 


10/01/18 01:02


CXR: No acute change on initial preliminary read in ED. 


10/01/18 01:33


DUPLEX LE: No DVT





Patient stable for d/c with return precautions. Advised to f/u with PMD, and 

cardiology. 





*DC/Admit/Observation/Transfer


Diagnosis at time of Disposition: 


Chest pain


Qualifiers:


 Chest pain type: unspecified Qualified Code(s): R07.9 - Chest pain, unspecified








- Discharge Dispostion


Condition at time of disposition: Stable


Decision to Admit order: No





- Referrals





- Patient Instructions


Printed Discharge Instructions:  DI for Atypical Chest Pain


Additional Instructions: 


Please return to the emergency department with any new or worsening symptoms or 

concerns. Please follow up with your primary care physician within 72 hours.








- Post Discharge Activity





- Attestations


Physician Attestion: 





09/30/18 21:57


I attest to the information provided in this note.
Statement Selected